# Patient Record
Sex: FEMALE | Race: AMERICAN INDIAN OR ALASKA NATIVE | NOT HISPANIC OR LATINO
[De-identification: names, ages, dates, MRNs, and addresses within clinical notes are randomized per-mention and may not be internally consistent; named-entity substitution may affect disease eponyms.]

---

## 2020-10-29 ENCOUNTER — APPOINTMENT (OUTPATIENT)
Dept: ORTHOPEDIC SURGERY | Facility: CLINIC | Age: 40
End: 2020-10-29
Payer: COMMERCIAL

## 2020-10-29 VITALS
SYSTOLIC BLOOD PRESSURE: 123 MMHG | DIASTOLIC BLOOD PRESSURE: 80 MMHG | WEIGHT: 185 LBS | HEART RATE: 84 BPM | HEIGHT: 61 IN | BODY MASS INDEX: 34.93 KG/M2

## 2020-10-29 PROCEDURE — 99072 ADDL SUPL MATRL&STAF TM PHE: CPT

## 2020-10-29 PROCEDURE — 99204 OFFICE O/P NEW MOD 45 MIN: CPT

## 2020-10-29 PROCEDURE — 73610 X-RAY EXAM OF ANKLE: CPT | Mod: LT

## 2020-10-29 RX ORDER — MELOXICAM 7.5 MG/1
7.5 TABLET ORAL
Qty: 30 | Refills: 1 | Status: ACTIVE | COMMUNITY
Start: 2020-10-29 | End: 1900-01-01

## 2020-10-29 NOTE — HISTORY OF PRESENT ILLNESS
[FreeTextEntry1] : 40 year old female presenting with left ankle pain. The patient’s pain is noted to be a 5/10. The patient's pain began on 10/15/2020. The patient describes their pain as constant, and has increased pain when standing and walking. She is currently taking NSAIDs. No other complaints at this time.

## 2020-10-29 NOTE — DISCUSSION/SUMMARY
[de-identified] : Today I had a lengthy discussion with the patient regarding their left ankle pain. I have addressed all the patient's concerns surrounding the pathology of their condition. I recommend that the patient utilize ice, NSAIDS PRN, and heat. She can also elevate the left ankle above the level of the heart. I recommend the patient undergo a course of physical therapy for the bilateral feet and ankles 2-3 times a week for a total of 6-8 weeks. A prescription was given for the physical therapy today. I recommend that the patient utilize meloxicam with food once per day as instructed. A prescription for the meloxicam was ordered for the patient in the office today. A discussion was had about utilizing custom orthotics. The patient was educated about custom orthotics in the office today and a prescription was provided today. I would like to see the patient back in the office PRN to reassess their condition. The patient understood and verbally agreed to the treatment plan. All of their questions were answered and they were satisfied with the visit. The patient should call the office if they have any questions or experience worsening symptoms.

## 2020-10-29 NOTE — ADDENDUM
[FreeTextEntry1] : I, José Luis Scott, acted solely as a scribe for Dr. Edmundo Ayala on this date 10/29/2020 .\par All medical record entries made by the Scribe were at my, Dr. Edmundo Ayala, direction and personally dictated by me on 10/29/2020 . I have reviewed the chart and agree that the record accurately reflects my personal performance of the history, physical exam, assessment and plan. I have also personally directed, reviewed, and agreed with the chart.

## 2020-10-29 NOTE — CONSULT LETTER
[Consult Letter:] : I had the pleasure of evaluating your patient, [unfilled]. [Please see my note below.] : Please see my note below. [Consult Closing:] : Thank you very much for allowing me to participate in the care of this patient.  If you have any questions, please do not hesitate to contact me. [Sincerely,] : Sincerely, [FreeTextEntry3] : Edmundo Ayala, DO\par Foot and Ankle Surgery\par

## 2020-10-29 NOTE — PHYSICAL EXAM
[de-identified] : General: Alert and oriented x3. In no acute distress. Pleasant in nature with a normal affect. No apparent respiratory distress.\par \par L Ankle Exam\par Skin: Clean, dry, intact\par Inspection: No obvious malalignment, no swelling, no effusion; no lymphadenopathy\par Pulses: 2+ DP/PT pulses\par ROM: L Ankle 10 degrees of dorsiflexion, 40 degrees of plantarflexion, 10 degrees of subtalar motion\par Tenderness: +posterior peroneals, +posterior tibial tendon. No tenderness over the lateral malleolus, no CFL/ATFL/PTFL pain. No medial malleolus pain, no deltoid ligament pain. No proximal fibular pain. No heel pain.\par Stability: Negative anterior/posterior drawer.\par Strength: 5/5 TA/GS/EHL\par Neuro: In tact to light touch throughout\par Additional tests: Negative Mortons test, Negative syndesmosis squeeze test. \par \par BL Standing Exam\par +pes planus [de-identified] : 3V of the left ankle were ordered obtained and reviewed by me today, 10/29/2020 , revealed: No fracture. Posterior tibial tendon dysfunction.

## 2021-09-03 ENCOUNTER — EMERGENCY (EMERGENCY)
Facility: HOSPITAL | Age: 41
LOS: 0 days | Discharge: HOME | End: 2021-09-03
Attending: EMERGENCY MEDICINE
Payer: COMMERCIAL

## 2021-09-03 VITALS
RESPIRATION RATE: 15 BRPM | DIASTOLIC BLOOD PRESSURE: 83 MMHG | SYSTOLIC BLOOD PRESSURE: 147 MMHG | TEMPERATURE: 98 F | HEART RATE: 62 BPM | OXYGEN SATURATION: 98 %

## 2021-09-03 VITALS — WEIGHT: 164.91 LBS | HEIGHT: 65 IN

## 2021-09-03 DIAGNOSIS — N89.8 OTHER SPECIFIED NONINFLAMMATORY DISORDERS OF VAGINA: ICD-10-CM

## 2021-09-03 PROCEDURE — 99285 EMERGENCY DEPT VISIT HI MDM: CPT

## 2021-09-03 PROCEDURE — 76856 US EXAM PELVIC COMPLETE: CPT

## 2021-09-03 PROCEDURE — 99284 EMERGENCY DEPT VISIT MOD MDM: CPT | Mod: 25

## 2021-09-03 PROCEDURE — 76830 TRANSVAGINAL US NON-OB: CPT | Mod: 26

## 2021-09-03 PROCEDURE — 76856 US EXAM PELVIC COMPLETE: CPT | Mod: 26

## 2021-09-03 PROCEDURE — 76830 TRANSVAGINAL US NON-OB: CPT

## 2021-09-03 NOTE — ED STATDOCS - OBJECTIVE STATEMENT
40 y/o F w/ no PMHx presents to ED c/o partial tampon left in vagina while taking out tampon, called OB/GYN and was told to come to ER. Denies pain and bleeding.

## 2021-09-03 NOTE — ED STATDOCS - PROGRESS NOTE DETAILS
42 y/o Female presents to ED c/o piece of tampon breaking off while she was removing it today.  I performed a pelvic exam and was unable to visualize or palpate any FB in vaginal vault / canal.  Small amount of blood in vaginal canal.  Cervix pink, Neg CMT.  Ordered pelvic US to confirm no FB present.  Radha Woody PA-C

## 2021-09-03 NOTE — ED STATDOCS - PATIENT PORTAL LINK FT
You can access the FollowMyHealth Patient Portal offered by [facility] by registering at the following website: [email or web address]. By joining [facility]’s FollowMyHealth portal, you will also be able to view your health information using other applications (apps) compatible with our system.

## 2022-10-18 ENCOUNTER — APPOINTMENT (OUTPATIENT)
Dept: HUMAN REPRODUCTION | Facility: CLINIC | Age: 42
End: 2022-10-18

## 2022-11-14 ENCOUNTER — APPOINTMENT (OUTPATIENT)
Dept: OBGYN | Facility: CLINIC | Age: 42
End: 2022-11-14
Payer: COMMERCIAL

## 2022-11-14 VITALS — DIASTOLIC BLOOD PRESSURE: 80 MMHG | SYSTOLIC BLOOD PRESSURE: 147 MMHG

## 2022-11-14 VITALS — HEIGHT: 61 IN | WEIGHT: 196.13 LBS | BODY MASS INDEX: 37.03 KG/M2

## 2022-11-14 LAB
HCG UR QL: POSITIVE
QUALITY CONTROL: YES

## 2022-11-14 PROCEDURE — 81025 URINE PREGNANCY TEST: CPT

## 2022-11-17 ENCOUNTER — ASOB RESULT (OUTPATIENT)
Age: 42
End: 2022-11-17

## 2022-11-17 ENCOUNTER — APPOINTMENT (OUTPATIENT)
Dept: OBGYN | Facility: CLINIC | Age: 42
End: 2022-11-17

## 2022-11-17 ENCOUNTER — APPOINTMENT (OUTPATIENT)
Dept: ANTEPARTUM | Facility: CLINIC | Age: 42
End: 2022-11-17

## 2022-11-17 DIAGNOSIS — O02.1 MISSED ABORTION: ICD-10-CM

## 2022-11-17 PROCEDURE — 99213 OFFICE O/P EST LOW 20 MIN: CPT

## 2022-11-17 PROCEDURE — 76817 TRANSVAGINAL US OBSTETRIC: CPT

## 2022-11-17 NOTE — DISCUSSION/SUMMARY
[FreeTextEntry1] : Discussed the results of her transvaginal ultrasound.  Ultrasound findings are consistent with a spontaneous miscarriage.  There is evidence of an intrauterine pregnancy.  There is an absent gestational sac as well as absent yolk sac and fetal pole and cardiac activity.  At this time I will trend her hCG levels.  hCG drawn today.  Patient to have labs drawn on Saturday.  She is to follow-up with Dr. Bragg on Monday or Tuesday of next week.  All of her questions were answered she is agreeable with plan

## 2022-11-17 NOTE — CHIEF COMPLAINT
[Urgent Visit] : Urgent Visit [FreeTextEntry1] : Patient is a 42-year-old female here today for possible missed .  She is approximately 6 weeks 6 days pregnant.  Her last menstrual period was .  She began spotting yesterday and this morning a little heavier.  She had a transvaginal sonogram today.

## 2022-11-18 LAB — HCG SERPL-MCNC: 1021 MIU/ML

## 2022-11-21 ENCOUNTER — APPOINTMENT (OUTPATIENT)
Dept: OBGYN | Facility: CLINIC | Age: 42
End: 2022-11-21

## 2022-11-21 ENCOUNTER — APPOINTMENT (OUTPATIENT)
Dept: ANTEPARTUM | Facility: CLINIC | Age: 42
End: 2022-11-21

## 2022-11-21 ENCOUNTER — RESULT CHARGE (OUTPATIENT)
Age: 42
End: 2022-11-21

## 2022-11-21 LAB
HCG SERPL-MCNC: 845 MIU/ML
HEMOGLOBIN: 12.7

## 2022-11-21 PROCEDURE — 99213 OFFICE O/P EST LOW 20 MIN: CPT

## 2022-11-21 PROCEDURE — 85018 HEMOGLOBIN: CPT | Mod: QW

## 2022-11-22 ENCOUNTER — APPOINTMENT (OUTPATIENT)
Dept: ANTEPARTUM | Facility: CLINIC | Age: 42
End: 2022-11-22

## 2022-11-22 ENCOUNTER — EMERGENCY (EMERGENCY)
Facility: HOSPITAL | Age: 42
LOS: 0 days | Discharge: HOME | End: 2022-11-22
Attending: EMERGENCY MEDICINE
Payer: COMMERCIAL

## 2022-11-22 ENCOUNTER — APPOINTMENT (OUTPATIENT)
Dept: OBGYN | Facility: CLINIC | Age: 42
End: 2022-11-22

## 2022-11-22 ENCOUNTER — ASOB RESULT (OUTPATIENT)
Age: 42
End: 2022-11-22

## 2022-11-22 ENCOUNTER — TRANSCRIPTION ENCOUNTER (OUTPATIENT)
Age: 42
End: 2022-11-22

## 2022-11-22 VITALS
SYSTOLIC BLOOD PRESSURE: 157 MMHG | DIASTOLIC BLOOD PRESSURE: 90 MMHG | TEMPERATURE: 99 F | OXYGEN SATURATION: 100 % | RESPIRATION RATE: 18 BRPM | HEART RATE: 62 BPM

## 2022-11-22 VITALS — HEIGHT: 61 IN | WEIGHT: 195.99 LBS

## 2022-11-22 DIAGNOSIS — Z3A.01 LESS THAN 8 WEEKS GESTATION OF PREGNANCY: ICD-10-CM

## 2022-11-22 DIAGNOSIS — O09.521 SUPERVISION OF ELDERLY MULTIGRAVIDA, FIRST TRIMESTER: ICD-10-CM

## 2022-11-22 DIAGNOSIS — O00.90 UNSPECIFIED ECTOPIC PREGNANCY WITHOUT INTRAUTERINE PREGNANCY: ICD-10-CM

## 2022-11-22 DIAGNOSIS — O20.0 THREATENED ABORTION: ICD-10-CM

## 2022-11-22 DIAGNOSIS — O34.11 MATERNAL CARE FOR BENIGN TUMOR OF CORPUS UTERI, FIRST TRIMESTER: ICD-10-CM

## 2022-11-22 DIAGNOSIS — D25.9 LEIOMYOMA OF UTERUS, UNSPECIFIED: ICD-10-CM

## 2022-11-22 LAB
ALBUMIN SERPL ELPH-MCNC: 3.6 G/DL — SIGNIFICANT CHANGE UP (ref 3.3–5)
ALP SERPL-CCNC: 70 U/L — SIGNIFICANT CHANGE UP (ref 40–120)
ALT FLD-CCNC: 13 U/L — SIGNIFICANT CHANGE UP (ref 12–78)
ANION GAP SERPL CALC-SCNC: 4 MMOL/L — LOW (ref 5–17)
APPEARANCE UR: CLEAR — SIGNIFICANT CHANGE UP
APTT BLD: 30.4 SEC — SIGNIFICANT CHANGE UP (ref 27.5–35.5)
AST SERPL-CCNC: 11 U/L — LOW (ref 15–37)
BASOPHILS # BLD AUTO: 0.11 K/UL — SIGNIFICANT CHANGE UP (ref 0–0.2)
BASOPHILS NFR BLD AUTO: 1.1 % — SIGNIFICANT CHANGE UP (ref 0–2)
BILIRUB SERPL-MCNC: 0.2 MG/DL — SIGNIFICANT CHANGE UP (ref 0.2–1.2)
BILIRUB UR-MCNC: NEGATIVE — SIGNIFICANT CHANGE UP
BUN SERPL-MCNC: 19 MG/DL — SIGNIFICANT CHANGE UP (ref 7–23)
CALCIUM SERPL-MCNC: 8.6 MG/DL — SIGNIFICANT CHANGE UP (ref 8.5–10.1)
CHLORIDE SERPL-SCNC: 109 MMOL/L — HIGH (ref 96–108)
CO2 SERPL-SCNC: 27 MMOL/L — SIGNIFICANT CHANGE UP (ref 22–31)
COLOR SPEC: YELLOW — SIGNIFICANT CHANGE UP
CREAT SERPL-MCNC: 0.7 MG/DL — SIGNIFICANT CHANGE UP (ref 0.5–1.3)
DIFF PNL FLD: ABNORMAL
EGFR: 111 ML/MIN/1.73M2 — SIGNIFICANT CHANGE UP
EOSINOPHIL # BLD AUTO: 0.27 K/UL — SIGNIFICANT CHANGE UP (ref 0–0.5)
EOSINOPHIL NFR BLD AUTO: 2.7 % — SIGNIFICANT CHANGE UP (ref 0–6)
FLUAV AG NPH QL: SIGNIFICANT CHANGE UP
FLUBV AG NPH QL: SIGNIFICANT CHANGE UP
GLUCOSE SERPL-MCNC: 96 MG/DL — SIGNIFICANT CHANGE UP (ref 70–99)
GLUCOSE UR QL: NEGATIVE — SIGNIFICANT CHANGE UP
HCG SERPL-ACNC: 935 MIU/ML — HIGH
HCG SERPL-MCNC: 956 MIU/ML
HCT VFR BLD CALC: 40.9 % — SIGNIFICANT CHANGE UP (ref 34.5–45)
HGB BLD-MCNC: 13.8 G/DL — SIGNIFICANT CHANGE UP (ref 11.5–15.5)
IMM GRANULOCYTES NFR BLD AUTO: 0.2 % — SIGNIFICANT CHANGE UP (ref 0–0.9)
INR BLD: 1.02 RATIO — SIGNIFICANT CHANGE UP (ref 0.88–1.16)
KETONES UR-MCNC: ABNORMAL
LEUKOCYTE ESTERASE UR-ACNC: ABNORMAL
LYMPHOCYTES # BLD AUTO: 3.2 K/UL — SIGNIFICANT CHANGE UP (ref 1–3.3)
LYMPHOCYTES # BLD AUTO: 31.8 % — SIGNIFICANT CHANGE UP (ref 13–44)
MCHC RBC-ENTMCNC: 29.2 PG — SIGNIFICANT CHANGE UP (ref 27–34)
MCHC RBC-ENTMCNC: 33.7 GM/DL — SIGNIFICANT CHANGE UP (ref 32–36)
MCV RBC AUTO: 86.7 FL — SIGNIFICANT CHANGE UP (ref 80–100)
MONOCYTES # BLD AUTO: 0.69 K/UL — SIGNIFICANT CHANGE UP (ref 0–0.9)
MONOCYTES NFR BLD AUTO: 6.9 % — SIGNIFICANT CHANGE UP (ref 2–14)
NEUTROPHILS # BLD AUTO: 5.76 K/UL — SIGNIFICANT CHANGE UP (ref 1.8–7.4)
NEUTROPHILS NFR BLD AUTO: 57.3 % — SIGNIFICANT CHANGE UP (ref 43–77)
NITRITE UR-MCNC: NEGATIVE — SIGNIFICANT CHANGE UP
PH UR: 5 — SIGNIFICANT CHANGE UP (ref 5–8)
PLATELET # BLD AUTO: 342 K/UL — SIGNIFICANT CHANGE UP (ref 150–400)
POTASSIUM SERPL-MCNC: 4.2 MMOL/L — SIGNIFICANT CHANGE UP (ref 3.5–5.3)
POTASSIUM SERPL-SCNC: 4.2 MMOL/L — SIGNIFICANT CHANGE UP (ref 3.5–5.3)
PROT SERPL-MCNC: 7.8 GM/DL — SIGNIFICANT CHANGE UP (ref 6–8.3)
PROT UR-MCNC: 30 MG/DL
PROTHROM AB SERPL-ACNC: 11.8 SEC — SIGNIFICANT CHANGE UP (ref 10.5–13.4)
RBC # BLD: 4.72 M/UL — SIGNIFICANT CHANGE UP (ref 3.8–5.2)
RBC # FLD: 12.3 % — SIGNIFICANT CHANGE UP (ref 10.3–14.5)
RSV RNA NPH QL NAA+NON-PROBE: SIGNIFICANT CHANGE UP
SARS-COV-2 RNA SPEC QL NAA+PROBE: SIGNIFICANT CHANGE UP
SODIUM SERPL-SCNC: 140 MMOL/L — SIGNIFICANT CHANGE UP (ref 135–145)
SP GR SPEC: 1.02 — SIGNIFICANT CHANGE UP (ref 1.01–1.02)
UROBILINOGEN FLD QL: 1
WBC # BLD: 10.05 K/UL — SIGNIFICANT CHANGE UP (ref 3.8–10.5)
WBC # FLD AUTO: 10.05 K/UL — SIGNIFICANT CHANGE UP (ref 3.8–10.5)

## 2022-11-22 PROCEDURE — 80053 COMPREHEN METABOLIC PANEL: CPT

## 2022-11-22 PROCEDURE — 99284 EMERGENCY DEPT VISIT MOD MDM: CPT | Mod: 25

## 2022-11-22 PROCEDURE — 85018 HEMOGLOBIN: CPT | Mod: QW

## 2022-11-22 PROCEDURE — 86901 BLOOD TYPING SEROLOGIC RH(D): CPT

## 2022-11-22 PROCEDURE — 76817 TRANSVAGINAL US OBSTETRIC: CPT | Mod: 26

## 2022-11-22 PROCEDURE — 0241U: CPT

## 2022-11-22 PROCEDURE — 76817 TRANSVAGINAL US OBSTETRIC: CPT

## 2022-11-22 PROCEDURE — 85610 PROTHROMBIN TIME: CPT

## 2022-11-22 PROCEDURE — 84702 CHORIONIC GONADOTROPIN TEST: CPT

## 2022-11-22 PROCEDURE — 85025 COMPLETE CBC W/AUTO DIFF WBC: CPT

## 2022-11-22 PROCEDURE — 86850 RBC ANTIBODY SCREEN: CPT

## 2022-11-22 PROCEDURE — 99214 OFFICE O/P EST MOD 30 MIN: CPT

## 2022-11-22 PROCEDURE — 96372 THER/PROPH/DIAG INJ SC/IM: CPT

## 2022-11-22 PROCEDURE — 99285 EMERGENCY DEPT VISIT HI MDM: CPT

## 2022-11-22 PROCEDURE — 86900 BLOOD TYPING SEROLOGIC ABO: CPT

## 2022-11-22 PROCEDURE — 87086 URINE CULTURE/COLONY COUNT: CPT

## 2022-11-22 PROCEDURE — 81001 URINALYSIS AUTO W/SCOPE: CPT

## 2022-11-22 PROCEDURE — 85730 THROMBOPLASTIN TIME PARTIAL: CPT

## 2022-11-22 PROCEDURE — 36415 COLL VENOUS BLD VENIPUNCTURE: CPT

## 2022-11-22 RX ORDER — METHOTREXATE 2.5 MG/1
100 TABLET ORAL ONCE
Refills: 0 | Status: COMPLETED | OUTPATIENT
Start: 2022-11-22 | End: 2022-11-22

## 2022-11-22 RX ORDER — SODIUM CHLORIDE 9 MG/ML
1000 INJECTION INTRAMUSCULAR; INTRAVENOUS; SUBCUTANEOUS ONCE
Refills: 0 | Status: COMPLETED | OUTPATIENT
Start: 2022-11-22 | End: 2022-11-22

## 2022-11-22 RX ADMIN — SODIUM CHLORIDE 1000 MILLILITER(S): 9 INJECTION INTRAMUSCULAR; INTRAVENOUS; SUBCUTANEOUS at 18:05

## 2022-11-22 RX ADMIN — METHOTREXATE 100 MILLIGRAM(S): 2.5 TABLET ORAL at 21:52

## 2022-11-22 NOTE — PLAN
[FreeTextEntry1] : Discussed results of sonogram her fibroids need to be addressed after methotrexate is given for suspected ectopic pregnancy. I spoke with patient with MD marie on speaker phone and recommendation is to send patient to the ED.\par Patient sent to the ED for methotrexate for suspected ctopic pregnancy. \par Plan is for patient to follow up in office next Tuesday for an apt with JW and hcg level.\par all her questions were answered she was agreeable with plan.\par OBGYN residents  aware and ED was called and gave report. \par

## 2022-11-22 NOTE — ED STATDOCS - NSFOLLOWUPINSTRUCTIONS_ED_ALL_ED_FT
FOLLOW UP WITH DR OCAMPO ON FRIDAY. CALL THE OFFICE TO MAKE AN APPOINTMENT. RETURN TO ER FOR ANY WORSENING SYMPTOMS OR NEW CONCERNS.     Ectopic Pregnancy       An ectopic pregnancy happens when a fertilized egg attaches (implants) outside the uterus. In a normal pregnancy, a fertilized egg implants in the uterus. An ectopic pregnancy cannot develop into a healthy baby. Most ectopic pregnancies occur in one of the fallopian tubes, which is where an egg travels from an ovary to get to the uterus. This is called a tubal pregnancy. An ectopic pregnancy can also happen on an ovary, on the cervix, or in the abdomen.    When a fertilized egg implants on tissue outside the uterus and begins to grow, it may cause the tissue to tear or burst. This is known as a ruptured ectopic pregnancy. The tear or burst causes internal bleeding. This may cause intense pain in the abdomen. An ectopic pregnancy is a medical emergency and can be life-threatening.      What are the causes?    The most common cause of this condition is damage to one of the fallopian tubes. A fallopian tube may be narrowed or blocked, and that stops the fertilized egg from reaching the uterus.    Sometimes, the cause of this condition is not known.      What increases the risk?    The following factors may make you more likely to develop this condition:  •Having gone through infertility treatment before.      •Having had an ectopic pregnancy before.      •Having had surgery to have the fallopian tubes tied.      •Becoming pregnant while using an intrauterine device for birth control.      •Taking birth control pills before the age of 16.      Other risk factors include:  •Smoking.      •Alcohol use.      •History of JOSE exposure. JOSE is a medicine that was used until 1971 and affected babies whose mothers took the medicine.        What are the signs or symptoms?    Common symptoms of this condition include:  •Missing a menstrual period.      •Nausea or tiredness.      •Tender breasts.      •Other normal pregnancy symptoms.      Other symptoms may include:  •Pain during sex.      •Vaginal bleeding or spotting.      •Cramping or pain in the lower abdomen.      •A fast heartbeat, low blood pressure, and sweating.      •Pain or increased pressure while having a bowel movement.      Symptoms of a ruptured ectopic pregnancy and internal bleeding may include:  •Sudden, severe pain in the abdomen.      •Dizziness, weakness, feeling light-headed, or fainting.      •Pain in the shoulder or neck area.        How is this diagnosed?    This condition is diagnosed by:  •A blood test to check for the pregnancy hormone.      •A pelvic exam to find painful areas or a mass in the abdomen.      •Ultrasound. A probe is inserted into the vagina to see if there is a pregnancy in or outside the uterus.      •Taking a sample of tissue from the uterus.      •Surgery to look closely at the fallopian tubes through an incision in the abdomen.        How is this treated?    This condition is usually treated with medicine or surgery. Sometimes, ectopic pregnancies can resolve on their own, under close monitoring by your health care provider.    Medicine     A medicine called methotrexate may be given to cause the pregnancy tissue to be absorbed. The medicine may be given if:  •The diagnosis is made early, with no signs of active bleeding.      •The fallopian tube has not torn or burst.      You will need blood tests to make sure the medicine is working. It may take 4–6 weeks for the pregnancy tissues to be absorbed.    Surgery    Surgery may be performed to:  •Remove the pregnancy tissue.      •Stop internal bleeding.      •Remove part or all of the fallopian tube.      •Remove the uterus. This is rare.      After surgery, you may need to have blood tests to make sure the surgery worked.      Follow these instructions at home:    Medicines     •Take over-the-counter and prescription medicines only as told by your health care provider.    •Ask your health care provider if the medicine prescribed to you:  •Requires you to avoid driving or using machinery.    •Can cause constipation. You may need to take these actions to prevent or treat constipation:  •Drink enough fluid to keep your urine pale yellow.      •Take over-the-counter or prescription medicines.      •Eat foods that are high in fiber, such as beans, whole grains, and fresh fruits and vegetables.      •Limit foods that are high in fat and processed sugars, such as fried or sweet foods.          General instructions     •Rest or limit your activity, if told by your health care provider.      • Do not have sex or put anything in your vagina, such as tampons or douches, for 6 weeks or until your health care provider says it is safe.      • Do not lift anything that is heavier than 10 lb (4.5 kg), or the limit that you are told, until your health care provider says that it is safe.      •Return to your normal activities as told by your health care provider. Ask your health care provider what activities are safe for you.      •Keep all follow-up visits. This is important.        Contact a health care provider if:    •You have a fever or chills.      •You have nausea and vomiting.        Get help right away if:    •Your pain gets worse or is not relieved by medicine.      •You feel dizzy or weak.      •You feel light-headed or you faint.      •You have sudden, severe pain in your abdomen.      •You have sudden pain in the shoulder or neck area.        Summary    •An ectopic pregnancy happens when a fertilized egg implants outside the uterus. Most ectopic pregnancies occur in one of the fallopian tubes.      •An ectopic pregnancy is a medical emergency and can be life-threatening.      •The most common cause of this condition is damage to one of the fallopian tubes.      •This condition is usually treated with medicine or surgery. Some ectopic pregnancies resolve on their own, under close monitoring by your health care provider.      This information is not intended to replace advice given to you by your health care provider. Make sure you discuss any questions you have with your health care provider.      Document Revised: 03/30/2021 Document Reviewed: 03/30/2021    Elsevier Patient Education © 2022 [employer]

## 2022-11-22 NOTE — ED ADULT NURSE NOTE - CHIEF COMPLAINT QUOTE
Patient is alert and oriented X3, presenting to the ER with c/o ectopic pregnancy. Patient reports "I am about 7-8 weeks pregnant. Last Thursday I began to spot so they took me into the OB and did a sonogram. The bleeding over the weekend was worse, I went through 1 pad every couple hours. Then today they did a sonogram where they found the pregnancy  outside the uterus. Dr. Bragg advised that I come to the ER for further evaluation." Denies CP, SOB, fevers, nausea, vomiting. .   HX: denies. No medication taken prior to arrival.

## 2022-11-22 NOTE — HISTORY OF PRESENT ILLNESS
[FreeTextEntry1] : Due to patients hcg levels going from 1021 to 845 to 956, patient was advised to have a pelvic sonogram today to r/o ectopic pregnancy\par \par Her sonogram today revealed no intrauterine pregnancy and ectopic cannot be ruled out.\par Her sonogram also revealed 2 fibroids measuring 5cm and 3cm\par \par patient is still bleeding at this time, it is a little lighter than over the weekend.\par \par

## 2022-11-22 NOTE — ED STATDOCS - OBJECTIVE STATEMENT
42 year old female currently 7 weeks pregnant presents to the ED complaining of a threatened .  pt began vaginally bleeding with 3 days of sharp stabbing abdominal pain. Pt went for a transvaginal ultrasound on , was told it was likely a miscarriage. Pt had outpatient lab work on  to test HCG levels. Pt endorses that bleeding was worse over the weekend. Pt was called today and was told HCG levels have not gone down. Pt reports that bleeding has started to get lighter today. This is pt's second pregnancy, 1st was an  when she was 21 years old. Denies feeling faint or lightheaded.

## 2022-11-22 NOTE — ED STATDOCS - NS ED ROS FT
Constitutional: nad, well appearing  HEENT:  no nasal congestion, eye drainage or ear pain.    CVS:  no cp  Resp:  No sob, no cough  GI:  no abdominal pain, no nausea or vomiting  :  no dysuria. + vaginal bleeding  MSK: no joint pain or limited ROM  Skin: no rash  Neuro: no change in mental status or level of consciousness  Heme/lymph: no bleeding

## 2022-11-22 NOTE — ED ADULT NURSE NOTE - OBJECTIVE STATEMENT
Pt, presenting to the ER with c/o ectopic pregnancy. Patient reports "I am about 7-8 weeks pregnant". Last thurs pt had some vag bleeding mild spotting and went to OB for a sonogram. The bleeding over the weekend was worse, has mild bleeding  today. I went through 1 pad every couple hours. Then today they did a sonogram where they found the pregnancy  outside the uterus. Dr. Bragg advised that I come to the ER for further evaluation." Denies CP, SOB, fevers, nausea, vomiting. . Pt tearful accompanied by partner.

## 2022-11-22 NOTE — ED ADULT TRIAGE NOTE - CHIEF COMPLAINT QUOTE
Patient is alert and oriented X3, presenting to the ER with c/o ectopic pregnancy. Patient reports "I am about 7-8 weeks pregnant. Last Thursday I began to spot so they took me into the OB and did a sonogram. The bleeding over the weekend was worse, I went through 1 pad every couple hours. Then today they did a sonogram where they found the pregnancy  outside the uterus. Dr. Bragg advised that I come to the ER for further evaluation." Denies CP, SOB, fevers, nausea, vomiting.   HX: denies. No medication taken prior to arrival. Patient is alert and oriented X3, presenting to the ER with c/o ectopic pregnancy. Patient reports "I am about 7-8 weeks pregnant. Last Thursday I began to spot so they took me into the OB and did a sonogram. The bleeding over the weekend was worse, I went through 1 pad every couple hours. Then today they did a sonogram where they found the pregnancy  outside the uterus. Dr. Bragg advised that I come to the ER for further evaluation." Denies CP, SOB, fevers, nausea, vomiting. .   HX: denies. No medication taken prior to arrival.

## 2022-11-22 NOTE — ED STATDOCS - PATIENT PORTAL LINK FT
You can access the FollowMyHealth Patient Portal offered by [facility] by registering at the following website: [email or web address]. By joining [facility]’s FollowMyHealth portal, you will also be able to view your health information using other applications (apps) compatible with our system.

## 2022-11-22 NOTE — ED STATDOCS - CLINICAL SUMMARY MEDICAL DECISION MAKING FREE TEXT BOX
Will discuss with OB. Pt had document IUP on outpatient ultrasound today, likelihood of ectopic or heterotopic pregnancy low. Determine need for methotrexate and reassess. Will discuss with OB. Pt had documented IUP on outpatient ultrasound today, likelihood of ectopic or heterotopic pregnancy low. Determine need for methotrexate and reassess.

## 2022-11-22 NOTE — CONSULT NOTE ADULT - SUBJECTIVE AND OBJECTIVE BOX
HPI: 42y  approx 7wks presents from [facility] office after being sent by Dr. Bragg for inappropriately rising bhcg on outpatient labs. Pt initially with bhcg  1021,  845,  956, with inappropriate rise, and outpatient sono today at [facility]  with pregnancy of unknown location, no IUP visualized. Given bhcg trend Pt OBGYN was concerned for ectopic pregnancy and sent her in for methotrexate. Pt reports irregular bleeding since saturday which initially improved and then got heavy again. Denies pain, nausea, vomiting. TVUS and bhcg repeat here in ED and bcg from today is 935, plateau.    PMH: denies  PSH: ear surgery, tonsillectomy  OB: etOP @ age 21  GYN: denies  SOCIAL: denies  Allergies    No Known Allergies    Intolerances      MEDS:      Vital Signs Last 24 Hrs  T(C): 36.9 (2022 16:41), Max: 36.9 (2022 16:41)  T(F): 98.4 (2022 16:41), Max: 98.4 (2022 16:41)  HR: 67 (2022 16:41) (67 - 67)  BP: 144/83 (2022 16:41) (144/83 - 144/83)  BP(mean): 102 (2022 16:41) (102 - 102)  RR: 18 (2022 16:41) (18 - 18)  SpO2: 100% (2022 16:41) (100% - 100%)    Parameters below as of 2022 16:41  Patient On (Oxygen Delivery Method): room air      PHYSICAL EXAM:  GENERAL: NAD  ABDOMEN: Soft, Nontender, Nondistended; Bowel sounds present  EXTREMITIES:  2+ Peripheral Pulses, No clubbing, cyanosis, or edema  PELVIC: deferred    LABS:                        13.8   10.05 )-----------( 342      ( 2022 18:03 )             40.9         140  |  109<H>  |  19  ----------------------------<  96  4.2   |  27  |  0.70    Ca    8.6      2022 18:03    TPro  7.8  /  Alb  3.6  /  TBili  0.2  /  DBili  x   /  AST  11<L>  /  ALT  13  /  AlkPhos  70  11-    Urinalysis Basic - ( 2022 18:03 )    Color: Yellow / Appearance: Clear / S.025 / pH: x  Gluc: x / Ketone: Trace  / Bili: Negative / Urobili: 1   Blood: x / Protein: 30 mg/dL / Nitrite: Negative   Leuk Esterase: Trace / RBC: 11-25 /HPF / WBC 0-2   Sq Epi: x / Non Sq Epi: Moderate / Bacteria: Occasional          RADIOLOGY STUDIES:  TVUS pending

## 2022-11-22 NOTE — ED STATDOCS - PROGRESS NOTE DETAILS
signed Astrid Mueller PA-C Pt seen initially in intake by Dr Parsons.   42F sent from OB Dr Bragg's office for methotrexate for ectopic pregnancy. I spoke to OB GAY Proctor, resident is in OR, she will relay that pt is here. signed Astrid Mueller PA-C   No IUP on sono, hcg 900. OB will see pt and consent for MTX in ED. Pt to f/u with Dr Bragg on Friday. outpatient read that patient had with her was reportedly prelim and there was in fact no IUP identified.  This was discussed with OB and verified in HIE.  Plan as per ob.  AVSS.

## 2022-11-22 NOTE — CONSULT NOTE ADULT - ASSESSMENT
A/P:42y  approx 7wks with putpatient TVUS with no IUP and inappropriately rising/plateauing bhcg concerning for ectopic pregnancy  CBC, CMP wnl  B pos, no rhogam indicated  hcg 1021> 845> 956> 935, inappropriately rising bhcg/plateauing with no IUP on outpatient sono concerning for ectopic pregnancy  will await inpatient sono, however likely will proceed with methotrexate    The patient was counseled extensively on expectant vs medical vs surgical management for ectopic pregnancy. Risks of expectant management were reviewed including tubal rupture, hemorrhage, emergency surgery, and death. Benefits of medical management were reviewed including avoiding  the inherent risks of surgery and anesthesia. Risks of medical management with methotrexate were reviewed including possible rupture of ectopic, possible need for re-dose, and possible need for subsequent surgery discussed. Contraindications were reviewed including absolute (Intrauterine pregnancy, Evidence of immunodeficiency, Moderate to severe anemia, leukopenia, or thrombocytopenia, Sensitivity to methotrexate, Active pulmonary disease, Active peptic ulcer disease, Clinically important hepatic dysfunction, Clinically important renal dysfunction, Breastfeeding, Ruptured ectopic pregnancy, Hemodynamically unstable patient, Inability to participate in follow-up) and relative (Embryonic cardiac activity detected by transvaginal ultrasonography, High initial hCG concentration, Ectopic pregnancy greater than 4 cm in size as imaged by TVUS, Refusal to accept blood transfusion) contraindications. Risks of methotrexate were reviewed including but not limited to GI problems (nausea, vomiting, stomatitis), vaginal spotting, abdominal pain/cramping, elevated liver enzymes, rare incidence of alopecia and pneumonitis. Medical management, compared with surgical management, has a 15-20% lower success rate and requires longer surveillance, more office visits, and phlebotomy. Effects of future fertility were reviewed with the patient. Patient was counseled that evidence suggests that methotrexate treatment of ectopic pregnancy does not have an adverse effect on subsequent fertility or on ovarian reserve.    Patient elected for management with methotrexate. Consents for methotrexate signed with patient with attending at bedside. Chemotherapy drug order form sent  to pharmacy with methotrexate dose calculated based on BSA for patient. Patient was given strict precautions (severe abdominal pain, dizziness, lightheadedness, severe n/v, or any heavy vaginal bleeding >2pads/hour for >2hours) on when to return and educated about the symptoms of tubal rupture and to emphasize the need to seek immediate medical attention if these symptoms occur. Vigorous activity and sexual intercourse recommended to be avoided until confirmation of resolution because of the theoretical risk of inducing rupture of the ectopic pregnancy. Advised to avoid folic acid supplements, foods that contain folic acid, and nonsteroidal antiinflammatory drugs during therapy because these products may decrease the efficacy of methotrexate. Recommended avoidance of narcotic analgesic medications, alcohol, and gas-producing foods are recommended so as not to mask, or be confused with, escalation of symptoms of rupture. Sunlight exposure also should be avoided during treatment to limit the risk of methotrexate dermatitis. Pt understands she cannot attempt to conceive for at least 3 months. Patient instructed on need for follow up of b-hcg on day 4 and day 7 of methotrexate therapy. Once patient receives methotrexate therapy she is cleared for discharge from OBGYN perspective.  Primary management per ED team.     d/w Dr. Burrell

## 2022-11-24 LAB
CULTURE RESULTS: SIGNIFICANT CHANGE UP
SPECIMEN SOURCE: SIGNIFICANT CHANGE UP

## 2022-11-25 ENCOUNTER — EMERGENCY (EMERGENCY)
Facility: HOSPITAL | Age: 42
LOS: 0 days | Discharge: HOME | End: 2022-11-25
Attending: EMERGENCY MEDICINE
Payer: COMMERCIAL

## 2022-11-25 VITALS
RESPIRATION RATE: 18 BRPM | TEMPERATURE: 98 F | SYSTOLIC BLOOD PRESSURE: 116 MMHG | OXYGEN SATURATION: 100 % | HEART RATE: 62 BPM | DIASTOLIC BLOOD PRESSURE: 83 MMHG

## 2022-11-25 VITALS — HEIGHT: 61 IN | WEIGHT: 195.11 LBS

## 2022-11-25 DIAGNOSIS — Z01.89 ENCOUNTER FOR OTHER SPECIFIED SPECIAL EXAMINATIONS: ICD-10-CM

## 2022-11-25 LAB — HCG SERPL-ACNC: 670 MIU/ML — HIGH

## 2022-11-25 PROCEDURE — 99284 EMERGENCY DEPT VISIT MOD MDM: CPT

## 2022-11-25 PROCEDURE — 99283 EMERGENCY DEPT VISIT LOW MDM: CPT

## 2022-11-25 PROCEDURE — 36415 COLL VENOUS BLD VENIPUNCTURE: CPT

## 2022-11-25 PROCEDURE — 84702 CHORIONIC GONADOTROPIN TEST: CPT

## 2022-11-25 NOTE — ED STATDOCS - NSFOLLOWUPINSTRUCTIONS_ED_ALL_ED_FT
Methotrexate Treatment for an Ectopic Pregnancy      Methotrexate is a medicine that treats an ectopic pregnancy. In this type of pregnancy, the fertilized egg attaches (implants) outside the uterus. An ectopic pregnancy cannot develop into a healthy baby. Methotrexate works by stopping the growth of the fertilized egg. It also helps the body absorb tissue from the egg. This takes about 2–6 weeks.    An ectopic pregnancy can be life-threatening. However, most ectopic pregnancies can be successfully treated with methotrexate if they are diagnosed early.      Tell a health care provider about:    •Any allergies you have.      •All medicines you are taking, including vitamins, herbs, eye drops, creams, and over-the-counter medicines.      •Any medical conditions you have.        What are the risks?    Generally, this is a safe treatment. However, problems may occur, including:•Digestive problems. You may have:  •Nausea.      •Vomiting.      •Diarrhea.      •Cramping in your abdomen.        •Bleeding or spotting from your vagina.      •Feeling dizzy or light-headed.      •Mouth sores.      •Inflammation of the lining of your lungs (pneumonitis).      •Damage to nearby structures or organs, such as damage to the liver.      •Hair loss.      There is a risk that methotrexate treatment will fail and the pregnancy will continue. There is also a risk that the ectopic pregnancy might tear or burst (rupture) during use of this medicine.      What happens before the procedure?    •Blood tests will be done to check how your disease-fighting system (immune system), liver, and kidneys are working.      •You will also have blood tests to measure your pregnancy hormone levels and to find out your blood type.    •You will be given a shot of a medicine called Rho(D) immune globulin if:  •You are Rh-negative and the father is Rh-positive.      •You are Rh-negative and the father's Rh type is unknown.          What happens during the procedure?  •Methotrexate will be injected into your muscle.  •Methotrexate may be given as a single dose of medicine or a series of doses over time, depending on your response to the treatment.      •Methotrexate injections are given by a health care provider. Injection is the most common way that this medicine is used to treat an ectopic pregnancy.        •You may also receive other medicines to manage your ectopic pregnancy.      The procedure may vary among health care providers and hospitals.      What can I expect after treatment?    After your treatment, it is common to have:  •Cramping in your abdomen.      •Bleeding in your vagina.      •Tiredness (fatigue).      •Nausea.      •Vomiting.      •Diarrhea.      Blood tests will be done at timed intervals for several days or weeks to check your pregnancy hormone levels. The blood tests will be done until the pregnancy hormone can no longer be found in the blood.    If the methotrexate treatment does not work, a surgical procedure may be done to remove the ectopic pregnancy.      Follow these instructions at home:     Medicines     •Take over-the-counter and prescription medicines only as told by your health care provider.      • Do not take prescription pain medicines, aspirin, ibuprofen, naproxen, or any other NSAIDs.      • Do not take folic acid, prenatal vitamins, or other vitamins that contain folic acid.      Activity     • Do not have sex, douche, or put anything, such as tampons, in your vagina until your health care provider says it is okay.      •Limit activities that take a lot of effort as told by your health care provider.      General instructions     • Do not drink alcohol.      •Follow instructions from your health care provider about eating restrictions, such as avoiding foods that produce a lot of gas. These foods can hide the signs of a ruptured ectopic pregnancy.      •Limit exposure to sunlight or artificial UV light such as from tanning beds. Methotrexate can make you more sensitive to the sun.      •Follow instructions from your health care provider on how and when to report any symptoms that may indicate a ruptured ectopic pregnancy.      •Keep all follow-up visits. This is important.        Contact a health care provider if:    •You have persistent nausea and vomiting.      •You have persistent diarrhea.    •You are having a reaction to the medicine. This may include:  •Unusual fatigue.      •Skin rash.          Get help right away if:    •Pain in your abdomen or in the area between your hip bones (pelvic area) gets worse.      •You have more bleeding from your vagina.      •You feel light-headed or you faint.      •You are short of breath.      •Your heart rate increases.      •You develop a cough.      •You have chills or a fever.        Summary    •Methotrexate is a medicine that treats an ectopic pregnancy. This type of pregnancy forms outside the uterus.      •There is a risk that methotrexate treatment will fail and the pregnancy will continue. There is also a risk that the ectopic pregnancy might tear or burst during use of this medicine.      •This medicine may be given in a single dose or a series of doses over time.      •After your treatment, blood tests will be done at timed intervals for several days or weeks to check your pregnancy hormone levels. The blood tests will be done until no more pregnancy hormone is found in the blood.

## 2022-11-25 NOTE — ED ADULT TRIAGE NOTE - CHIEF COMPLAINT QUOTE
Pt comes to the ED for bloodwork. Pt was seen in ED on Tuesday and received methotrexate. Pt states that she was told to come back today for the bloodwork because OB is not open. Pt with no complaints.

## 2022-11-25 NOTE — PROGRESS NOTE ADULT - SUBJECTIVE AND OBJECTIVE BOX
AN YBARRA  42y  Female [id number]    HPI:  43yo female here for Day 4 bhcg s/p MTX on 11/22.  Patient appears well, denies any pain, and has no complaints.  Bhcg on 11/22 was 935 and today is 670.  US without evidence of ectopic or IUP.    REVIEW OF SYSTEMS:  CONSTITUTIONAL: No weakness, fevers or chills  EYES/ENT: No visual changes;  No vertigo or throat pain   NECK: No pain or stiffness  RESPIRATORY: No cough, wheezing, hemoptysis; No shortness of breath  CARDIOVASCULAR: No chest pain or palpitations  GASTROINTESTINAL: No abdominal or epigastric pain. No nausea, vomiting, or hematemesis; No diarrhea or constipation. No melena or hematochezia.  GENITOURINARY: No dysuria, frequency or hematuria  NEUROLOGICAL: No numbness or weakness  SKIN: No itching, burning, rashes, or lesions   All other review of systems is negative unless indicated above    MEDICATIONS  (STANDING):    MEDICATIONS  (PRN):      Allergies    No Known Allergies    Intolerances        PAST MEDICAL & SURGICAL HISTORY:  No pertinent past medical history      No significant past surgical history          OB/GYN HISTORY:   Menarchy           Cycle Length              Days Flow                                       Last Menstrual Period                                         G    P                                       Last Pap smear:   [id number]                                            FAMILY HISTORY:      SOCIAL HISTORY:    Vital Signs Last 24 Hrs  T(C): 36.8 (25 Nov 2022 12:20), Max: 36.8 (25 Nov 2022 12:20)  T(F): 98.3 (25 Nov 2022 12:20), Max: 98.3 (25 Nov 2022 12:20)  HR: 62 (25 Nov 2022 12:20) (62 - 62)  BP: 116/83 (25 Nov 2022 12:20) (116/83 - 116/83)  BP(mean): 95 (25 Nov 2022 12:20) (95 - 95)  RR: 18 (25 Nov 2022 12:20) (18 - 18)  SpO2: 100% (25 Nov 2022 12:20) (100% - 100%)    Parameters below as of 25 Nov 2022 12:20  Patient On (Oxygen Delivery Method): room air        Last Menstrual Period      PHYSICAL EXAM:  Constitutional: NAD, awake and alert, well-developed  HEENT: PERR, EOMI, Normal Hearing, MMM  Neck: Soft and supple, No LAD, No JVD  Respiratory: Breath sounds are clear bilaterally, No wheezing, rales or rhonchi  Cardiovascular: S1 and S2, regular rate and rhythm, no Murmurs, gallops or rubs  Gastrointestinal: Bowel Sounds present, soft, nontender, nondistended, no guarding, no rebound  Extremities: No peripheral edema  Vascular: 2+ peripheral pulses  Neurological: A/O x 3, no focal deficits  Musculoskeletal: 5/5 strength b/l upper and lower extremities  Skin: No rashes    Plan:  Pt already has appt for Day 7 labs in the office with Dr. Bragg.  All hemorrhage, sab, and ectopic precautions reviewed.  All questions answered.  SUN Yee MD

## 2022-11-25 NOTE — ED STATDOCS - OBJECTIVE STATEMENT
43 y/o female w/ a PMHx of  presents to the ED for repeat blood word after receiving Methotrexate shot for an ectopic pregnancy x4 days ago. Pt states she was supposed to get the repeat labs at her OB office but the office is closed all weekend. Pt reports some bleeding, states it is improving. Denies dizziness and lightheadedness. OBGYN: Dr. Bragg. Nonsmoker. Nondrinker. NKDA.

## 2022-11-25 NOTE — ED STATDOCS - PATIENT PORTAL LINK FT
You can access the FollowMyHealth Patient Portal offered by  by registering at the following website: [email or web address]. By joining [facility]’s FollowMyHealth portal, you will also be able to view your health information using other applications (apps) compatible with our system.

## 2022-11-25 NOTE — ED STATDOCS - CLINICAL SUMMARY MEDICAL DECISION MAKING FREE TEXT BOX
41 y/o female presents for repeat HCG. Pt had Methotrexate for ectopy pregnancy, told to get repat HCG on days 4 and 7 however OB is closed, no Sx otherwise. Will touch base w/ OB, check beta, and reassess. 43 y/o female presents for repeat HCG. Pt had Methotrexate for ectopy pregnancy, told to get repeat HCG on days 4 and 7 however OB is closed, no Sx otherwise. Will touch base w/ OB, check beta, and reassess.

## 2022-11-25 NOTE — ED STATDOCS - NS_ ATTENDINGSCRIBEDETAILS _ED_A_ED_FT
I, Wade Botello MD,  performed the initial face to face bedside interview with this patient regarding history of present illness, review of symptoms and relevant past medical, social and family history.  I completed an independent physical examination.  I was the initial provider who evaluated this patient.   I personally saw the patient and performed a substantive portion of the visit including all aspects of the medical decision making.  The history, relevant review of systems, past medical and surgical history, medical decision making, and physical examination was documented by the scribe in my presence and I attest to the accuracy of the documentation.

## 2022-11-25 NOTE — ED STATDOCS - ATTENDING APP SHARED VISIT CONTRIBUTION OF CARE
I, Wade Botello MD, personally saw the patient with ACP.  I have personally performed a face to face diagnostic evaluation on this patient.  I have reviewed the ACP note and agree with the history, exam, and plan of care, except as noted.   The initial assessment was performed by myself and then the patient was handed off to the ACP. The patient was followed and re-evaluated by the ACP. All labs, imaging and procedures were evaluated and performed by the ACP and I was available for consultation if any questions in the patients care came up.

## 2022-11-25 NOTE — ED STATDOCS - PROGRESS NOTE DETAILS
spoke to OB team will consult. ORTIZ RASHID pt aware of beta level still awaiting ob team. elena RASHID OB team eval pt and recommend fu with Dr. Bragg on Monday as schedule. -Sue Pina PA-C

## 2022-11-25 NOTE — ED ADULT NURSE NOTE - OBJECTIVE STATEMENT
Pt 42yr old female, A&OX4 and ambulatory, sent in Dr. Bragg's office from HCG level s/p Methotrexate injection 4 days ago. Has no complaints at this time.

## 2022-11-25 NOTE — ED STATDOCS - NS ED ROS FT
Constitutional: No fever or chills  Eyes: No visual changes  HEENT: No throat pain  CV: No chest pain  Resp: No SOB no cough  GI: No abd pain, nausea or vomiting  : No dysuria +vaginal bleeding  MSK: No musculoskeletal pain  Skin: No rash  Neuro: No headache

## 2022-11-25 NOTE — ED STATDOCS - CARE PROVIDER_API CALL
Иван Bragg)  Obstetrics and Gynecology  [address] [place] [address]  Phone: [phone]  Fax: [phone]  Follow Up Time: Urgent

## 2022-11-28 ENCOUNTER — APPOINTMENT (OUTPATIENT)
Dept: OBGYN | Facility: CLINIC | Age: 42
End: 2022-11-28

## 2022-11-28 PROCEDURE — 99213 OFFICE O/P EST LOW 20 MIN: CPT

## 2022-11-28 PROCEDURE — ZZZZZ: CPT

## 2022-11-28 NOTE — DISCUSSION/SUMMARY
[FreeTextEntry1] : Vaginal restrictions at this time. \par Will trend Hcg at this time. \par Patient to follow up with me in 2 weeks for final pelvic check and a sonogram. patient states she had a fibroid on US which is not noted in the final report. \par Weekly hcg. \par all of her questions were answered she is agreeable with plan.\par \par \par \par I Leonarda SALAZAR-C am scribing for the presence of Dr. Bragg the following sections HISTORY OF PRESENT ILLNESS, PAST MEDICAL/FAMILY/SOCIAL HISTORY; REVIEW OF SYSTEMS; VITAL SIGNS; PHYSICAL EXAM; DISPOSITION. \par \par \par I personally performed the services described in the documentation, reviewed the documentation recorded by the scribe in my presence and it accurately and completely records my words and actions.\par

## 2022-11-28 NOTE — CHIEF COMPLAINT
[Urgent Visit] : Urgent Visit [FreeTextEntry1] : Patient is a 43 y/o female here today for missed AB. She states this weekend her bleeding got much heavier since last thursday. Her hcg have been declining. She had one on thursday 1021 and saturday 845.

## 2022-11-28 NOTE — PHYSICAL EXAM
[Chaperone Present] : A chaperone was present in the examining room during all aspects of the physical examination [Labia Majora] : labia major [Labia Minora] : labia minora [Normal] : clitoris [Heavy] : there was heavy vaginal bleeding [FreeTextEntry1] : Leonarda SALAZAR-PAZ chaperoned during entire physical exam [FreeTextEntry5] : cervix closed

## 2022-11-29 ENCOUNTER — APPOINTMENT (OUTPATIENT)
Dept: OBGYN | Facility: CLINIC | Age: 42
End: 2022-11-29

## 2022-11-29 LAB — HCG SERPL-MCNC: 268 MIU/ML

## 2022-11-30 ENCOUNTER — APPOINTMENT (OUTPATIENT)
Dept: OBGYN | Facility: CLINIC | Age: 42
End: 2022-11-30

## 2022-12-01 NOTE — CHIEF COMPLAINT
[Urgent Visit] : Urgent Visit [FreeTextEntry1] : Patient is a 43 y/o female here today for hcg labs. She had a missed AB at around 6 weeks which was likely ectopic. She had methotrexate 7 days ago. her bleeding has eased up at this time.

## 2022-12-01 NOTE — DISCUSSION/SUMMARY
[FreeTextEntry1] : \par \par Will f/u with patient on hcg results. \par she has an apt next week for a follow up. \par all of her questions were answered she is agreeable with plan. \par \par \par \par I Leonarda JONESC am scribing for the presence of Dr. Bragg the following sections HISTORY OF PRESENT ILLNESS, PAST MEDICAL/FAMILY/SOCIAL HISTORY; REVIEW OF SYSTEMS; VITAL SIGNS; PHYSICAL EXAM; DISPOSITION. \par \par \par I personally performed the services described in the documentation, reviewed the documentation recorded by the scribe in my presence and it accurately and completely records my words and actions.\par

## 2022-12-05 ENCOUNTER — APPOINTMENT (OUTPATIENT)
Dept: ANTEPARTUM | Facility: CLINIC | Age: 42
End: 2022-12-05

## 2022-12-05 ENCOUNTER — APPOINTMENT (OUTPATIENT)
Dept: OBGYN | Facility: CLINIC | Age: 42
End: 2022-12-05

## 2022-12-07 ENCOUNTER — APPOINTMENT (OUTPATIENT)
Dept: OBGYN | Facility: CLINIC | Age: 42
End: 2022-12-07

## 2022-12-07 VITALS — HEART RATE: 64 BPM | DIASTOLIC BLOOD PRESSURE: 89 MMHG | SYSTOLIC BLOOD PRESSURE: 137 MMHG

## 2022-12-07 DIAGNOSIS — D25.9 LEIOMYOMA OF UTERUS, UNSPECIFIED: ICD-10-CM

## 2022-12-07 PROCEDURE — 99213 OFFICE O/P EST LOW 20 MIN: CPT

## 2022-12-08 ENCOUNTER — NON-APPOINTMENT (OUTPATIENT)
Age: 42
End: 2022-12-08

## 2022-12-08 LAB — HCG SERPL-MCNC: 4 MIU/ML

## 2022-12-08 NOTE — HISTORY OF PRESENT ILLNESS
[FreeTextEntry1] :  Patient is a 41 y/o female here today for hcg labs. She had a missed AB at around 6 weeks which was likely ectopic. She had methotrexate  2 weeks  ago. her bleeding has eased up at this time. \par \par she is here today for repeat hcg and a pelvic exam

## 2022-12-08 NOTE — PHYSICAL EXAM
[Chaperone Present] : A chaperone was present in the examining room during all aspects of the physical examination [Appropriately responsive] : appropriately responsive [Alert] : alert [Oriented x3] : oriented x3 [Labia Majora] : normal [Labia Minora] : normal [Normal] : normal [Uterine Adnexae] : normal [FreeTextEntry1] : Leonarda SALAZAR-PAZ chaperoned during entire physical exam [FreeTextEntry5] : closed

## 2022-12-08 NOTE — PLAN
[FreeTextEntry1] : \par \par No restrictions at this time\par due to her uterine fibroids she likely needs the fibroid to removed before getting pregnant. Advised her this will delay pregnancy about 6 months. \par advised it may take a month for ovulation to kick back in \par all of her questions were answered she is agreeable with plan.\par \par \par \par I Leonarda SALAZAR-C am scribing for the presence of Dr. Bragg the following sections HISTORY OF PRESENT ILLNESS, PAST MEDICAL/FAMILY/SOCIAL HISTORY; REVIEW OF SYSTEMS; VITAL SIGNS; PHYSICAL EXAM; DISPOSITION. \par \par \par I personally performed the services described in the documentation, reviewed the documentation recorded by the scribe in my presence and it accurately and completely records my words and actions.\par

## 2022-12-17 ENCOUNTER — APPOINTMENT (OUTPATIENT)
Dept: MRI IMAGING | Facility: CLINIC | Age: 42
End: 2022-12-17
Payer: COMMERCIAL

## 2022-12-17 ENCOUNTER — OUTPATIENT (OUTPATIENT)
Dept: OUTPATIENT SERVICES | Facility: HOSPITAL | Age: 42
LOS: 1 days | End: 2022-12-17
Payer: COMMERCIAL

## 2022-12-17 DIAGNOSIS — D25.9 LEIOMYOMA OF UTERUS, UNSPECIFIED: ICD-10-CM

## 2022-12-17 PROCEDURE — A9585: CPT

## 2022-12-17 PROCEDURE — 72197 MRI PELVIS W/O & W/DYE: CPT | Mod: 26

## 2022-12-17 PROCEDURE — 72197 MRI PELVIS W/O & W/DYE: CPT

## 2022-12-21 ENCOUNTER — APPOINTMENT (OUTPATIENT)
Dept: OBGYN | Facility: CLINIC | Age: 42
End: 2022-12-21

## 2022-12-21 PROCEDURE — 99214 OFFICE O/P EST MOD 30 MIN: CPT

## 2022-12-28 NOTE — PHYSICAL EXAM
[Chaperone Present] : A chaperone was present in the examining room during all aspects of the physical examination [Appropriately responsive] : appropriately responsive [Alert] : alert [Oriented x3] : oriented x3 [Labia Majora] : normal [Labia Minora] : normal [Normal] : normal [Uterine Adnexae] : normal [FreeTextEntry1] : Leonarda SALAZAR-PAZ chaperoned during entire physical exam

## 2022-12-28 NOTE — PLAN
[FreeTextEntry1] : \par \par discussed treatment for uterie fibroids. Due to patients desire to get pregnant at this time she would be a good candidate for single site myomectomy. \par Discussed in detail she would likely need a c section for delivery and she will delay pregnancy 6 months post operatively. \par Discussed risk of bleeding, infection, injury to bowel and bladder in detail. see scanned in image. \par patient to follow up with me for final decision for surgery pending covid test,  pst, and endometrial biopsy. \par \par all of her questions were answered she is agreeable with plan. \par \par \par \par I Leonarda SALAZAR-PAZ am scribing for the presence of Dr. Bragg the following sections HISTORY OF PRESENT ILLNESS, PAST MEDICAL/FAMILY/SOCIAL HISTORY; REVIEW OF SYSTEMS; VITAL SIGNS; PHYSICAL EXAM; DISPOSITION. \par \par \par I personally performed the services described in the documentation, reviewed the documentation recorded by the scribe in my presence and it accurately and completely records my words and actions.\par

## 2022-12-28 NOTE — HISTORY OF PRESENT ILLNESS
[FreeTextEntry1] : Patient is a 43 y/o female here today for consultation after pelvic MRI. Patient had MRI done to evaluate uterine fibroids. MRI showed two myomas. She is s/p ectopic pregnancy in november. \par one pedunculate left fundal leiomyoma measuring about 5.7 x  5.2  6.5 cm \par and a right subserosal leiomyoma measuring 4.2 x 3.3  x  4.0 cm \par \par thickened appearance of junctional zone suggestive of adenomyosis

## 2023-01-06 ENCOUNTER — APPOINTMENT (OUTPATIENT)
Dept: INTERNAL MEDICINE | Facility: CLINIC | Age: 43
End: 2023-01-06

## 2023-02-06 ENCOUNTER — APPOINTMENT (OUTPATIENT)
Dept: OBGYN | Facility: CLINIC | Age: 43
End: 2023-02-06
Payer: COMMERCIAL

## 2023-02-06 ENCOUNTER — NON-APPOINTMENT (OUTPATIENT)
Age: 43
End: 2023-02-06

## 2023-02-06 DIAGNOSIS — Z00.00 ENCOUNTER FOR GENERAL ADULT MEDICAL EXAMINATION W/OUT ABNORMAL FINDINGS: ICD-10-CM

## 2023-02-06 LAB
HCG UR QL: NEGATIVE
QUALITY CONTROL: YES

## 2023-02-06 PROCEDURE — 58100 BIOPSY OF UTERUS LINING: CPT

## 2023-02-06 PROCEDURE — 81025 URINE PREGNANCY TEST: CPT

## 2023-02-06 NOTE — PROCEDURE
[Endometrial Biopsy] : Endometrial biopsy [Risks] : risks [Benefits] : benefits [Alternatives] : alternatives [Patient] : patient [Infection] : infection [Bleeding] : bleeding [Allergic Reaction] : allergic reaction [Uterine Perforation] : uterine perforation [No Premedication] : No premedication [None] : none [Tenaculum] : Tenaculum [Required Dilation] : required dilation [Specimen Collected] : collected [Sent to Pathology] : placed in buffered formalin and sent for pathology [Tolerated Well] : Patient tolerated the procedure well [No Complications] : No complications

## 2023-02-09 ENCOUNTER — APPOINTMENT (OUTPATIENT)
Dept: INTERNAL MEDICINE | Facility: CLINIC | Age: 43
End: 2023-02-09

## 2023-02-12 NOTE — PLAN
[FreeTextEntry1] : 42 year old female presents for endometrial biopsy prior to myomectomy. \par Plan pending pathology report \par Pt agreeable with plan, all questions answered at this time. \par \par \par I Yolie HICKS am scribing for the presence of Dr. Bragg the following sections HISTORY OF PRESENT ILLNESS, PAST MEDICAL/FAMILY/SOCIAL HISTORY; REVIEW OF SYSTEMS; VITAL SIGNS; PHYSICAL EXAM; DISPOSITION. \par \par \par I personally performed the services described in the documentation, reviewed the documentation recorded by the scribe in my presence and it accurately and completely records my words and actions.\par

## 2023-02-13 ENCOUNTER — NON-APPOINTMENT (OUTPATIENT)
Age: 43
End: 2023-02-13

## 2023-02-13 LAB — CORE LAB BIOPSY: NORMAL

## 2023-02-22 ENCOUNTER — OUTPATIENT (OUTPATIENT)
Dept: OUTPATIENT SERVICES | Facility: HOSPITAL | Age: 43
LOS: 1 days | End: 2023-02-22
Payer: COMMERCIAL

## 2023-02-22 ENCOUNTER — APPOINTMENT (OUTPATIENT)
Dept: OBGYN | Facility: CLINIC | Age: 43
End: 2023-02-22
Payer: COMMERCIAL

## 2023-02-22 VITALS — HEART RATE: 67 BPM | TEMPERATURE: 96.3 F | SYSTOLIC BLOOD PRESSURE: 134 MMHG | DIASTOLIC BLOOD PRESSURE: 84 MMHG

## 2023-02-22 VITALS
TEMPERATURE: 98 F | DIASTOLIC BLOOD PRESSURE: 72 MMHG | RESPIRATION RATE: 16 BRPM | HEIGHT: 61 IN | OXYGEN SATURATION: 100 % | SYSTOLIC BLOOD PRESSURE: 113 MMHG | WEIGHT: 199.96 LBS | HEART RATE: 65 BPM

## 2023-02-22 DIAGNOSIS — Z90.89 ACQUIRED ABSENCE OF OTHER ORGANS: Chronic | ICD-10-CM

## 2023-02-22 DIAGNOSIS — Z98.890 OTHER SPECIFIED POSTPROCEDURAL STATES: Chronic | ICD-10-CM

## 2023-02-22 DIAGNOSIS — Z01.818 ENCOUNTER FOR OTHER PREPROCEDURAL EXAMINATION: ICD-10-CM

## 2023-02-22 LAB
A1C WITH ESTIMATED AVERAGE GLUCOSE RESULT: 5.6 % — SIGNIFICANT CHANGE UP (ref 4–5.6)
ANION GAP SERPL CALC-SCNC: 5 MMOL/L — SIGNIFICANT CHANGE UP (ref 5–17)
APPEARANCE UR: CLEAR — SIGNIFICANT CHANGE UP
BASOPHILS # BLD AUTO: 0.08 K/UL — SIGNIFICANT CHANGE UP (ref 0–0.2)
BASOPHILS NFR BLD AUTO: 0.9 % — SIGNIFICANT CHANGE UP (ref 0–2)
BILIRUB UR-MCNC: NEGATIVE — SIGNIFICANT CHANGE UP
BLD GP AB SCN SERPL QL: SIGNIFICANT CHANGE UP
BUN SERPL-MCNC: 19 MG/DL — SIGNIFICANT CHANGE UP (ref 7–23)
CALCIUM SERPL-MCNC: 8.6 MG/DL — SIGNIFICANT CHANGE UP (ref 8.5–10.1)
CHLORIDE SERPL-SCNC: 108 MMOL/L — SIGNIFICANT CHANGE UP (ref 96–108)
CO2 SERPL-SCNC: 23 MMOL/L — SIGNIFICANT CHANGE UP (ref 22–31)
COLOR SPEC: YELLOW — SIGNIFICANT CHANGE UP
CREAT SERPL-MCNC: 0.69 MG/DL — SIGNIFICANT CHANGE UP (ref 0.5–1.3)
DIFF PNL FLD: NEGATIVE — SIGNIFICANT CHANGE UP
EGFR: 112 ML/MIN/1.73M2 — SIGNIFICANT CHANGE UP
EOSINOPHIL # BLD AUTO: 0.15 K/UL — SIGNIFICANT CHANGE UP (ref 0–0.5)
EOSINOPHIL NFR BLD AUTO: 1.6 % — SIGNIFICANT CHANGE UP (ref 0–6)
ESTIMATED AVERAGE GLUCOSE: 114 MG/DL — SIGNIFICANT CHANGE UP (ref 68–114)
GLUCOSE SERPL-MCNC: 86 MG/DL — SIGNIFICANT CHANGE UP (ref 70–99)
GLUCOSE UR QL: NEGATIVE — SIGNIFICANT CHANGE UP
HCT VFR BLD CALC: 40.1 % — SIGNIFICANT CHANGE UP (ref 34.5–45)
HEMOGLOBIN: 13.9
HGB BLD-MCNC: 13.7 G/DL — SIGNIFICANT CHANGE UP (ref 11.5–15.5)
IMM GRANULOCYTES NFR BLD AUTO: 0.2 % — SIGNIFICANT CHANGE UP (ref 0–0.9)
KETONES UR-MCNC: NEGATIVE — SIGNIFICANT CHANGE UP
LEUKOCYTE ESTERASE UR-ACNC: NEGATIVE — SIGNIFICANT CHANGE UP
LYMPHOCYTES # BLD AUTO: 3.53 K/UL — HIGH (ref 1–3.3)
LYMPHOCYTES # BLD AUTO: 37.7 % — SIGNIFICANT CHANGE UP (ref 13–44)
MCHC RBC-ENTMCNC: 29 PG — SIGNIFICANT CHANGE UP (ref 27–34)
MCHC RBC-ENTMCNC: 34.2 GM/DL — SIGNIFICANT CHANGE UP (ref 32–36)
MCV RBC AUTO: 84.8 FL — SIGNIFICANT CHANGE UP (ref 80–100)
MONOCYTES # BLD AUTO: 0.52 K/UL — SIGNIFICANT CHANGE UP (ref 0–0.9)
MONOCYTES NFR BLD AUTO: 5.6 % — SIGNIFICANT CHANGE UP (ref 2–14)
NEUTROPHILS # BLD AUTO: 5.06 K/UL — SIGNIFICANT CHANGE UP (ref 1.8–7.4)
NEUTROPHILS NFR BLD AUTO: 54 % — SIGNIFICANT CHANGE UP (ref 43–77)
NITRITE UR-MCNC: NEGATIVE — SIGNIFICANT CHANGE UP
PH UR: 6 — SIGNIFICANT CHANGE UP (ref 5–8)
PLATELET # BLD AUTO: 317 K/UL — SIGNIFICANT CHANGE UP (ref 150–400)
POTASSIUM SERPL-MCNC: 4.7 MMOL/L — SIGNIFICANT CHANGE UP (ref 3.5–5.3)
POTASSIUM SERPL-SCNC: 4.7 MMOL/L — SIGNIFICANT CHANGE UP (ref 3.5–5.3)
PROT UR-MCNC: NEGATIVE — SIGNIFICANT CHANGE UP
RBC # BLD: 4.73 M/UL — SIGNIFICANT CHANGE UP (ref 3.8–5.2)
RBC # FLD: 12 % — SIGNIFICANT CHANGE UP (ref 10.3–14.5)
SODIUM SERPL-SCNC: 136 MMOL/L — SIGNIFICANT CHANGE UP (ref 135–145)
SP GR SPEC: 1.02 — SIGNIFICANT CHANGE UP (ref 1.01–1.02)
UROBILINOGEN FLD QL: NEGATIVE — SIGNIFICANT CHANGE UP
WBC # BLD: 9.36 K/UL — SIGNIFICANT CHANGE UP (ref 3.8–10.5)
WBC # FLD AUTO: 9.36 K/UL — SIGNIFICANT CHANGE UP (ref 3.8–10.5)

## 2023-02-22 PROCEDURE — 86850 RBC ANTIBODY SCREEN: CPT

## 2023-02-22 PROCEDURE — 83036 HEMOGLOBIN GLYCOSYLATED A1C: CPT

## 2023-02-22 PROCEDURE — 99214 OFFICE O/P EST MOD 30 MIN: CPT

## 2023-02-22 PROCEDURE — 99214 OFFICE O/P EST MOD 30 MIN: CPT | Mod: 25

## 2023-02-22 PROCEDURE — 81003 URINALYSIS AUTO W/O SCOPE: CPT

## 2023-02-22 PROCEDURE — 85018 HEMOGLOBIN: CPT | Mod: QW

## 2023-02-22 PROCEDURE — 86900 BLOOD TYPING SEROLOGIC ABO: CPT

## 2023-02-22 PROCEDURE — 36415 COLL VENOUS BLD VENIPUNCTURE: CPT

## 2023-02-22 PROCEDURE — 80048 BASIC METABOLIC PNL TOTAL CA: CPT

## 2023-02-22 PROCEDURE — 86901 BLOOD TYPING SEROLOGIC RH(D): CPT

## 2023-02-22 PROCEDURE — 85025 COMPLETE CBC W/AUTO DIFF WBC: CPT

## 2023-02-22 RX ORDER — OXYCODONE 5 MG/1
5 TABLET ORAL
Qty: 12 | Refills: 0 | Status: ACTIVE | COMMUNITY
Start: 2023-02-22 | End: 1900-01-01

## 2023-02-22 RX ADMIN — AZITHROMYCIN DIHYDRATE 0 MG: 250 TABLET, FILM COATED ORAL at 00:00

## 2023-02-22 NOTE — H&P PST ADULT - NSICDXPASTMEDICALHX_GEN_ALL_CORE_FT
PAST MEDICAL HISTORY:  Eczema     Fibroids     GERD (gastroesophageal reflux disease)     Seasonal allergies

## 2023-02-22 NOTE — H&P PST ADULT - HISTORY OF PRESENT ILLNESS
42 years old female with fibroids. A1. She had a miscarriage 2022.  Work up since then including transvaginal sonogram. Menorrhagia. Denies abdominal pain or irregular menstrual cycle. Planned robotic single site myomectomy.

## 2023-02-22 NOTE — H&P PST ADULT - ASSESSMENT
42 years old female present to PST prior to robotic single site myomectomy with Dr. Bragg.    Plan   1. NPO as per ASU  2. Urine pregnancy on the day of surgery  3. Use E-Z sponge as directed  4. Drink a quart of extra  fluids the day before your surgery.  5. CBC, BMP, HGB AIC, Urinalysis sent to lab

## 2023-02-22 NOTE — H&P PST ADULT - NSICDXPASTSURGICALHX_GEN_ALL_CORE_FT
PAST SURGICAL HISTORY:  History of ear surgery     History of rhinoplasty     History of tonsillectomy

## 2023-02-22 NOTE — H&P PST ADULT - NSICDXFAMILYHX_GEN_ALL_CORE_FT
FAMILY HISTORY:  Mother  Still living? Yes, Estimated age: 71-80  Family history of osteoporosis, Age at diagnosis: Age Unknown

## 2023-02-22 NOTE — H&P PST ADULT - NSANTHOSAYNRD_GEN_A_CORE
No. FLORINA screening performed.  STOP BANG Legend: 0-2 = LOW Risk; 3-4 = INTERMEDIATE Risk; 5-8 = HIGH Risk

## 2023-02-23 DIAGNOSIS — Z01.818 ENCOUNTER FOR OTHER PREPROCEDURAL EXAMINATION: ICD-10-CM

## 2023-02-24 ENCOUNTER — TRANSCRIPTION ENCOUNTER (OUTPATIENT)
Age: 43
End: 2023-02-24

## 2023-02-24 ENCOUNTER — APPOINTMENT (OUTPATIENT)
Dept: OBGYN | Facility: HOSPITAL | Age: 43
End: 2023-02-24

## 2023-02-24 ENCOUNTER — RESULT REVIEW (OUTPATIENT)
Age: 43
End: 2023-02-24

## 2023-02-24 ENCOUNTER — OUTPATIENT (OUTPATIENT)
Dept: INPATIENT UNIT | Facility: HOSPITAL | Age: 43
LOS: 1 days | Discharge: HOME | End: 2023-02-24
Payer: COMMERCIAL

## 2023-02-24 VITALS
OXYGEN SATURATION: 99 % | WEIGHT: 190.04 LBS | DIASTOLIC BLOOD PRESSURE: 85 MMHG | RESPIRATION RATE: 16 BRPM | SYSTOLIC BLOOD PRESSURE: 137 MMHG | TEMPERATURE: 99 F | HEIGHT: 61 IN | HEART RATE: 65 BPM

## 2023-02-24 DIAGNOSIS — Z98.890 OTHER SPECIFIED POSTPROCEDURAL STATES: Chronic | ICD-10-CM

## 2023-02-24 DIAGNOSIS — D25.9 LEIOMYOMA OF UTERUS, UNSPECIFIED: ICD-10-CM

## 2023-02-24 DIAGNOSIS — Z90.89 ACQUIRED ABSENCE OF OTHER ORGANS: Chronic | ICD-10-CM

## 2023-02-24 LAB — HCG UR QL: NEGATIVE — SIGNIFICANT CHANGE UP

## 2023-02-24 PROCEDURE — C1889: CPT

## 2023-02-24 PROCEDURE — 88305 TISSUE EXAM BY PATHOLOGIST: CPT

## 2023-02-24 PROCEDURE — 81025 URINE PREGNANCY TEST: CPT

## 2023-02-24 PROCEDURE — 58545 LAPAROSCOPIC MYOMECTOMY: CPT | Mod: AS

## 2023-02-24 PROCEDURE — 88305 TISSUE EXAM BY PATHOLOGIST: CPT | Mod: 26

## 2023-02-24 PROCEDURE — C9399: CPT

## 2023-02-24 PROCEDURE — C1765: CPT

## 2023-02-24 PROCEDURE — S2900: CPT

## 2023-02-24 PROCEDURE — 58545 LAPAROSCOPIC MYOMECTOMY: CPT

## 2023-02-24 RX ORDER — SODIUM CHLORIDE 9 MG/ML
1000 INJECTION, SOLUTION INTRAVENOUS
Refills: 0 | Status: DISCONTINUED | OUTPATIENT
Start: 2023-02-24 | End: 2023-02-24

## 2023-02-24 RX ORDER — OXYCODONE AND ACETAMINOPHEN 5; 325 MG/1; MG/1
1 TABLET ORAL EVERY 4 HOURS
Refills: 0 | Status: DISCONTINUED | OUTPATIENT
Start: 2023-02-25 | End: 2023-02-25

## 2023-02-24 RX ORDER — OXYCODONE AND ACETAMINOPHEN 5; 325 MG/1; MG/1
2 TABLET ORAL EVERY 4 HOURS
Refills: 0 | Status: DISCONTINUED | OUTPATIENT
Start: 2023-02-25 | End: 2023-02-25

## 2023-02-24 RX ORDER — ACETAMINOPHEN 500 MG
1000 TABLET ORAL ONCE
Refills: 0 | Status: COMPLETED | OUTPATIENT
Start: 2023-02-24 | End: 2023-02-24

## 2023-02-24 RX ORDER — ACETAMINOPHEN 500 MG
650 TABLET ORAL EVERY 4 HOURS
Refills: 0 | Status: DISCONTINUED | OUTPATIENT
Start: 2023-02-25 | End: 2023-02-25

## 2023-02-24 RX ORDER — CELECOXIB 200 MG/1
400 CAPSULE ORAL ONCE
Refills: 0 | Status: COMPLETED | OUTPATIENT
Start: 2023-02-24 | End: 2023-02-24

## 2023-02-24 RX ORDER — GABAPENTIN 400 MG/1
600 CAPSULE ORAL ONCE
Refills: 0 | Status: COMPLETED | OUTPATIENT
Start: 2023-02-24 | End: 2023-02-24

## 2023-02-24 RX ORDER — FENTANYL CITRATE 50 UG/ML
50 INJECTION INTRAVENOUS
Refills: 0 | Status: DISCONTINUED | OUTPATIENT
Start: 2023-02-24 | End: 2023-02-24

## 2023-02-24 RX ORDER — SODIUM CHLORIDE 9 MG/ML
1000 INJECTION, SOLUTION INTRAVENOUS
Refills: 0 | Status: DISCONTINUED | OUTPATIENT
Start: 2023-02-25 | End: 2023-02-25

## 2023-02-24 RX ORDER — IBUPROFEN 200 MG
600 TABLET ORAL EVERY 6 HOURS
Refills: 0 | Status: DISCONTINUED | OUTPATIENT
Start: 2023-02-25 | End: 2023-02-25

## 2023-02-24 RX ORDER — ONDANSETRON 8 MG/1
4 TABLET, FILM COATED ORAL EVERY 6 HOURS
Refills: 0 | Status: DISCONTINUED | OUTPATIENT
Start: 2023-02-25 | End: 2023-02-25

## 2023-02-24 RX ORDER — HYDROMORPHONE HYDROCHLORIDE 2 MG/ML
1 INJECTION INTRAMUSCULAR; INTRAVENOUS; SUBCUTANEOUS
Refills: 0 | Status: DISCONTINUED | OUTPATIENT
Start: 2023-02-24 | End: 2023-02-24

## 2023-02-24 RX ORDER — AZITHROMYCIN 250 MG/1
250 TABLET, FILM COATED ORAL
Qty: 1 | Refills: 0 | Status: ACTIVE | COMMUNITY
Start: 2023-02-24 | End: 1900-01-01

## 2023-02-24 RX ORDER — OXYCODONE HYDROCHLORIDE 5 MG/1
10 TABLET ORAL ONCE
Refills: 0 | Status: DISCONTINUED | OUTPATIENT
Start: 2023-02-24 | End: 2023-02-24

## 2023-02-24 RX ADMIN — FENTANYL CITRATE 50 MICROGRAM(S): 50 INJECTION INTRAVENOUS at 20:37

## 2023-02-24 RX ADMIN — CELECOXIB 400 MILLIGRAM(S): 200 CAPSULE ORAL at 20:10

## 2023-02-24 RX ADMIN — CELECOXIB 400 MILLIGRAM(S): 200 CAPSULE ORAL at 13:53

## 2023-02-24 RX ADMIN — Medication 1000 MILLIGRAM(S): at 20:10

## 2023-02-24 RX ADMIN — GABAPENTIN 600 MILLIGRAM(S): 400 CAPSULE ORAL at 13:54

## 2023-02-24 RX ADMIN — OXYCODONE HYDROCHLORIDE 10 MILLIGRAM(S): 5 TABLET ORAL at 20:41

## 2023-02-24 RX ADMIN — FENTANYL CITRATE 50 MICROGRAM(S): 50 INJECTION INTRAVENOUS at 20:12

## 2023-02-24 RX ADMIN — Medication 1000 MILLIGRAM(S): at 13:53

## 2023-02-24 NOTE — ASU DISCHARGE PLAN (ADULT/PEDIATRIC) - NS MD DC FALL RISK RISK
For information on Fall & Injury Prevention, visit: [email or web address] OR  [email or web address] OR  https://www.cdc.gov/steadi/patient.html

## 2023-02-24 NOTE — PHYSICAL EXAM
[Chaperone Present] : A chaperone was present in the examining room during all aspects of the physical examination [Appropriately responsive] : appropriately responsive [Alert] : alert [No Acute Distress] : no acute distress [No Lymphadenopathy] : no lymphadenopathy [Regular Rate Rhythm] : regular rate rhythm [No Murmurs] : no murmurs [Clear to Auscultation B/L] : clear to auscultation bilaterally [Soft] : soft [Non-tender] : non-tender [Non-distended] : non-distended [No HSM] : No HSM [No Lesions] : no lesions [No Mass] : no mass [Oriented x3] : oriented x3 [Labia Majora] : normal [Labia Minora] : normal [Normal] : normal [Uterine Adnexae] : normal [Enlarged ___ wks] : enlarged [unfilled] ~Uweeks [FreeTextEntry1] : KAREN AlejandroC

## 2023-02-24 NOTE — BRIEF OPERATIVE NOTE - NSICDXBRIEFPOSTOP_GEN_ALL_CORE_FT
POST-OP DIAGNOSIS:  Fibroid uterus 24-Feb-2023 18:29:06  Иван Bragg   POST-OP DIAGNOSIS:  Fibroid uterus 24-Feb-2023 18:29:06  Иван Bragg  Adenomyosis 24-Feb-2023 19:22:15  Иван Bragg

## 2023-02-24 NOTE — BRIEF OPERATIVE NOTE - NSICDXBRIEFPROCEDURE_GEN_ALL_CORE_FT
PROCEDURES:  Exam under anesthesia, gynecologic 24-Feb-2023 18:27:27  Иван Bragg  Robot-assisted single incision pelviscopy 24-Feb-2023 18:27:56  Иван Bragg  Myomectomy, robot-assisted, laparoscopic, 1 to 4 myomas 24-Feb-2023 18:28:11 Иван Adamson

## 2023-02-24 NOTE — ASU PREOP CHECKLIST - AS BP NONINV METHOD
What Type Of Note Output Would You Prefer (Optional)?: Standard Output Hpi Title: Evaluation of Skin Lesions How Severe Are Your Spot(S)?: moderate Have Your Spot(S) Been Treated In The Past?: has not been treated electronic

## 2023-02-24 NOTE — ASU DISCHARGE PLAN (ADULT/PEDIATRIC) - CARE PROVIDER_API CALL
Иван Bragg)  Obstetrics and Gynecology  [address] [place] [address]  Phone: [phone]  Fax: [phone]  Follow Up Time: 2 weeks

## 2023-02-24 NOTE — HISTORY OF PRESENT ILLNESS
[FreeTextEntry1] : Patient here for final decision for surgery. plan for single site myomectomy. \par Discussed in detail she would likely need a c section for delivery and she will delay pregnancy 6 months post operatively. hx- ectopic pregnancy

## 2023-02-24 NOTE — DISCUSSION/SUMMARY
[FreeTextEntry1] : Discussed pre op and post op instructions in detail.\par Vaginal restrictions only for 6 weeks- no orgasms for 6 weeks.  \par Will delay conception for 6 months post op. \par all of patients questions regarding procedure were answered.\par consent signed by MD, patient and witness.\par she is to f/u with me 2 weeks post operatively. she is agreeable with plan.\par z-saira & oxy ordered today\par \par \par \par I Yolie SALAZAR-C am scribing for the presence of Dr. Bragg the following sections HISTORY OF PRESENT ILLNESS, PAST MEDICAL/FAMILY/SOCIAL HISTORY; REVIEW OF SYSTEMS; VITAL SIGNS; PHYSICAL EXAM; DISPOSITION. \par \par \par I personally performed the services described in the documentation, reviewed the documentation recorded by the scribe in my presence and it accurately and completely records my words and actions.\par

## 2023-02-24 NOTE — ASU DISCHARGE PLAN (ADULT/PEDIATRIC) - NURSING INSTRUCTIONS
Call MD for any increase in pain, nausea, vomiting; fever over 101F; swelling, redness, oozing at incision site; pain in calf. No creams, gels, or ointments on incisions. No swimming, hot tubs or bathing; do not submerge incision.     Next Ibuprofen available at 1200

## 2023-02-24 NOTE — ASU PATIENT PROFILE, ADULT - FALL HARM RISK - UNIVERSAL INTERVENTIONS
Bed in lowest position, wheels locked, appropriate side rails in place/Call bell, personal items and telephone in reach/Instruct patient to call for assistance before getting out of bed or chair/Non-slip footwear when patient is out of bed/[place] to call system/Physically safe environment - no spills, clutter or unnecessary equipment/Purposeful Proactive Rounding/Room/bathroom lighting operational, light cord in reach

## 2023-02-25 VITALS
OXYGEN SATURATION: 99 % | SYSTOLIC BLOOD PRESSURE: 119 MMHG | HEART RATE: 83 BPM | DIASTOLIC BLOOD PRESSURE: 63 MMHG | RESPIRATION RATE: 18 BRPM | TEMPERATURE: 99 F

## 2023-02-25 RX ORDER — CEFAZOLIN SODIUM 1 G
2000 VIAL (EA) INJECTION ONCE
Refills: 0 | Status: COMPLETED | OUTPATIENT
Start: 2023-02-25 | End: 2023-02-25

## 2023-02-25 RX ADMIN — Medication 600 MILLIGRAM(S): at 05:04

## 2023-02-25 RX ADMIN — Medication 650 MILLIGRAM(S): at 00:35

## 2023-02-25 RX ADMIN — Medication 650 MILLIGRAM(S): at 01:05

## 2023-02-25 NOTE — PROGRESS NOTE ADULT - ASSESSMENT
A/P: 42y now POD#1 s/p RA-SS myomectomy  Neuro: Pain well controlled. Continue current pain regimen.  CV: No history of cardiovascular disease. Blood pressure well controlled.  Pulm: No active disease. Saturating well on room air. Incentive spirometer use encouraged  GI: No active disease. Bowel sounds and function normal, tolerating PO diet. Continue current bowel regimen.   : voiding spontaneously.  Heme: Hgb stable  ID: Afebrile. No antibiotics indicated at this time.   DVT ppx: Ambulation encouraged, SCDs when in bed, lovenox ordered.  Dispo: Pt to be discharged home, has follow up on 3/8/23 with Dr. Bragg

## 2023-02-25 NOTE — PROGRESS NOTE ADULT - SUBJECTIVE AND OBJECTIVE BOX
AN YBARRA is a 42y now POD#1 s/p RA-SS myomectomy    S:    No acute events overnight.   Patient was seen and examined at bedside.   Patient has no complaints this AM.   Pain is well controlled with current treatment regimen.   Tolerating regular diet, denies N/V.   Ambulating without difficulty.   + flatus/-BM/+ voiding  She denies lightheadedness, dizziness, palpitations, chest pain and SOB.     O:   T(C): 37.2 (02-25-23 @ 07:55), Max: 37.6 (02-24-23 @ 21:00)  HR: 83 (02-25-23 @ 07:55) (65 - 84)  BP: 119/63 (02-25-23 @ 07:55) (119/63 - 157/86)  RR: 18 (02-25-23 @ 07:55) (14 - 18)  SpO2: 99% (02-25-23 @ 07:55) (92% - 99%)    Gen: NAD, AAOx3  CV: RRR, S1 S2 present  Pulm: CTAB  Abdomen: Soft, nondistended, appropriately tender, + BS   Pelvic: Pad inspected with minimal amount of dark red blood   Incision: Dressing saturated serosanguinous fluid, gauze removed and replaced with new tegaderm.  Extremities: No calf tenderness or edema     Labs:       02-24-23 @ 07:01  -  02-25-23 @ 07:00  --------------------------------------------------------  IN: 2125 mL / OUT: 800 mL / NET: 1325 mL

## 2023-02-28 LAB — SURGICAL PATHOLOGY STUDY: SIGNIFICANT CHANGE UP

## 2023-03-02 ENCOUNTER — NON-APPOINTMENT (OUTPATIENT)
Age: 43
End: 2023-03-02

## 2023-03-02 DIAGNOSIS — Z87.891 PERSONAL HISTORY OF NICOTINE DEPENDENCE: ICD-10-CM

## 2023-03-02 DIAGNOSIS — D25.9 LEIOMYOMA OF UTERUS, UNSPECIFIED: ICD-10-CM

## 2023-03-02 DIAGNOSIS — N80.03 ADENOMYOSIS OF THE UTERUS: ICD-10-CM

## 2023-03-10 ENCOUNTER — APPOINTMENT (OUTPATIENT)
Dept: OBGYN | Facility: CLINIC | Age: 43
End: 2023-03-10
Payer: COMMERCIAL

## 2023-03-10 VITALS — SYSTOLIC BLOOD PRESSURE: 131 MMHG | HEART RATE: 59 BPM | DIASTOLIC BLOOD PRESSURE: 82 MMHG

## 2023-03-10 PROBLEM — L30.9 DERMATITIS, UNSPECIFIED: Chronic | Status: ACTIVE | Noted: 2023-02-22

## 2023-03-10 PROBLEM — J30.2 OTHER SEASONAL ALLERGIC RHINITIS: Chronic | Status: ACTIVE | Noted: 2023-02-22

## 2023-03-10 PROBLEM — D21.9 BENIGN NEOPLASM OF CONNECTIVE AND OTHER SOFT TISSUE, UNSPECIFIED: Chronic | Status: ACTIVE | Noted: 2023-02-22

## 2023-03-10 PROBLEM — K21.9 GASTRO-ESOPHAGEAL REFLUX DISEASE WITHOUT ESOPHAGITIS: Chronic | Status: ACTIVE | Noted: 2023-02-22

## 2023-03-10 LAB — HEMOGLOBIN: 12

## 2023-03-10 PROCEDURE — 99024 POSTOP FOLLOW-UP VISIT: CPT

## 2023-03-10 PROCEDURE — 85018 HEMOGLOBIN: CPT | Mod: QW

## 2023-03-10 NOTE — HISTORY OF PRESENT ILLNESS
[Pain is well-controlled] : pain is well-controlled [Clean/Dry/Intact] : clean, dry and intact [Mild] : mild vaginal bleeding [Pathology reviewed] : pathology reviewed [Fever] : no fever [Chills] : no chills [Nausea] : no nausea [Diarrhea] : no diarrhea [Vaginal Bleeding] : no vaginal bleeding [Pelvic Pressure] : no pelvic pressure [Vaginal Discharge] : no vaginal discharge [Constipation] : no constipation [Erythema] : not erythematous [Swelling] : not swollen [de-identified] : Patient is a 43 yo female here today for post op visit. SHe is s/p single site myomectomy on  2/24/23 for fibroid uterus.

## 2023-03-10 NOTE — PLAN
[No Herrick] : to avoid sexual intercourse [No Tampons/Suppositories] : against using tampons or vaginal suppositories [FreeTextEntry1] : \par Surgical photos and pathology reviewed with patient today\par advised heat for healing on belly button incision.\par she is to call me if she has any questions or concerns regarding her procedure\par she is to follow up with me in 4 weeks.\par all of her questions were answered she is agreeable with plan \par \par \par \par I Leonarda SALAZAR-C am scribing for the presence of Dr. Bragg the following sections HISTORY OF PRESENT ILLNESS, PAST MEDICAL/FAMILY/SOCIAL HISTORY; REVIEW OF SYSTEMS; VITAL SIGNS; PHYSICAL EXAM; DISPOSITION. \par \par \par I personally performed the services described in the documentation, reviewed the documentation recorded by the scribe in my presence and it accurately and completely records my words and actions.\par

## 2023-04-03 ENCOUNTER — APPOINTMENT (OUTPATIENT)
Dept: OTOLARYNGOLOGY | Facility: CLINIC | Age: 43
End: 2023-04-03

## 2023-04-06 NOTE — ED STATDOCS - ATTENDING CONTRIBUTION TO CARE
I,Moses Farley MD,  performed the initial face to face bedside interview with this patient regarding history of present illness, review of symptoms and relevant past medical, social and family history.  I completed an independent physical examination.  I was the initial provider who evaluated this patient. I have signed out the follow up of any pending tests (i.e. labs, radiological studies) to the ACP.  I have communicated the patient’s plan of care and disposition with the ACP.  The history, relevant review of systems, past medical and surgical history, medical decision making, and physical examination was documented by the scribe in my presence and I attest to the accuracy of the documentation.
No

## 2023-04-18 ENCOUNTER — APPOINTMENT (OUTPATIENT)
Dept: OBGYN | Facility: CLINIC | Age: 43
End: 2023-04-18
Payer: COMMERCIAL

## 2023-04-18 VITALS
DIASTOLIC BLOOD PRESSURE: 89 MMHG | HEIGHT: 61 IN | WEIGHT: 195 LBS | HEART RATE: 61 BPM | BODY MASS INDEX: 36.82 KG/M2 | SYSTOLIC BLOOD PRESSURE: 131 MMHG

## 2023-04-18 DIAGNOSIS — Z30.41 ENCOUNTER FOR SURVEILLANCE OF CONTRACEPTIVE PILLS: ICD-10-CM

## 2023-04-18 LAB — HEMOGLOBIN: 12.3

## 2023-04-18 PROCEDURE — 85018 HEMOGLOBIN: CPT | Mod: QW

## 2023-04-18 PROCEDURE — 99024 POSTOP FOLLOW-UP VISIT: CPT

## 2023-04-18 RX ORDER — NORETHINDRONE ACETATE AND ETHINYL ESTRADIOL 1.5; 3 MG/1; UG/1
1.5-3 TABLET ORAL DAILY
Qty: 3 | Refills: 3 | Status: ACTIVE | COMMUNITY
Start: 2023-04-18 | End: 1900-01-01

## 2023-04-18 NOTE — HISTORY OF PRESENT ILLNESS
[Pain is well-controlled] : pain is well-controlled [None] : no vaginal bleeding [Normal] : normal [Fever] : no fever [Chills] : no chills [Nausea] : no nausea [Vomiting] : no vomiting [Diarrhea] : no diarrhea [Vaginal Bleeding] : no vaginal bleeding [Pelvic Pressure] : no pelvic pressure [Dysuria] : no dysuria [Vaginal Discharge] : no vaginal discharge [Constipation] : no constipation [de-identified] : s/p single site myomectomy on 2/24/2023\par

## 2023-04-18 NOTE — PLAN
[FreeTextEntry1] : Reviewed pt will likely need a c section for delivery and she will delay pregnancy 6 months post operatively. Delay until September. \par Pt to f/u in July or August for her annual.\par OCPs for BC. Discussed this can prevent new fibroids. Plan to use until September. Pt to use a back up method for first 2 weeks. \par No restrictions\par Pt to call with heavy bleeding or pain not controlled with NSAIDs\par Supportive care measure discussed. \par All questions answered, pt agreeable with plan. \par \par Pt discussed she is interested in losing weight. Advised if she is interested in conceiving; prefer metformin over ozempic; evan if she is borderline diabetic. Advised PCP will likely suggest diet & exercise prior to medications. Pt states she has began walking & is looking into a more strenuous exercise regimen. \par \par Discussed fertility but not convinced she will need a fertility specialist at this time. Cycle is 25-28 days. \par \par \par I Yolie HICKS am scribing for the presence of Dr. Bragg the following sections HISTORY OF PRESENT ILLNESS, PAST MEDICAL/FAMILY/SOCIAL HISTORY; REVIEW OF SYSTEMS; VITAL SIGNS; PHYSICAL EXAM; DISPOSITION. \par \par \par I personally performed the services described in the documentation, reviewed the documentation recorded by the scribe in my presence and it accurately and completely records my words and actions.\par \par \par

## 2023-05-14 NOTE — ED ADULT NURSE NOTE - NSIMPLEMENTINTERV_GEN_ALL_ED
Implemented All Universal Safety Interventions:  [place] to call system. Call bell, personal items and telephone within reach. Instruct patient to call for assistance. Room bathroom lighting operational. Non-slip footwear when patient is off stretcher. Physically safe environment: no spills, clutter or unnecessary equipment. Stretcher in lowest position, wheels locked, appropriate side rails in place. Yes

## 2023-06-13 NOTE — ED ADULT NURSE NOTE - NSFALLRSKINDICATORS_ED_ALL_ED
Go for blood tests as directed. Your doctor will do lab tests at regular visits to monitor the effects of this medicine. Please follow up with your doctor and keep your health care provider appointments. no

## 2023-07-06 ENCOUNTER — NON-APPOINTMENT (OUTPATIENT)
Age: 43
End: 2023-07-06

## 2023-07-06 ENCOUNTER — APPOINTMENT (OUTPATIENT)
Dept: ORTHOPEDIC SURGERY | Facility: CLINIC | Age: 43
End: 2023-07-06
Payer: COMMERCIAL

## 2023-07-06 DIAGNOSIS — M76.821 POSTERIOR TIBIAL TENDINITIS, RIGHT LEG: ICD-10-CM

## 2023-07-06 DIAGNOSIS — M21.42 FLAT FOOT [PES PLANUS] (ACQUIRED), RIGHT FOOT: ICD-10-CM

## 2023-07-06 DIAGNOSIS — M25.572 PAIN IN LEFT ANKLE AND JOINTS OF LEFT FOOT: ICD-10-CM

## 2023-07-06 DIAGNOSIS — M76.822 POSTERIOR TIBIAL TENDINITIS, RIGHT LEG: ICD-10-CM

## 2023-07-06 DIAGNOSIS — M21.41 FLAT FOOT [PES PLANUS] (ACQUIRED), RIGHT FOOT: ICD-10-CM

## 2023-07-06 PROCEDURE — 73610 X-RAY EXAM OF ANKLE: CPT | Mod: LT

## 2023-07-06 PROCEDURE — 99213 OFFICE O/P EST LOW 20 MIN: CPT

## 2023-07-06 RX ORDER — MELOXICAM 15 MG/1
15 TABLET ORAL DAILY
Qty: 30 | Refills: 1 | Status: ACTIVE | COMMUNITY
Start: 2023-07-06 | End: 1900-01-01

## 2023-07-06 RX ORDER — DICLOFENAC SODIUM 1% 10 MG/G
1 GEL TOPICAL
Qty: 100 | Refills: 3 | Status: ACTIVE | COMMUNITY
Start: 2023-07-06 | End: 1900-01-01

## 2023-07-06 NOTE — DISCUSSION/SUMMARY
[de-identified] : I had a long discussion with the patient's continued left ankle pain that has been chronic.  She does have a flatfoot.  She is having a lot of pain on the outside portion of the ankle in the sinus tarsi region.  I do want to proceed with a left ankle MRI without contrast to evaluate the lateral aspect of the ankle as well as the posterior tibialis tendon for possible tearing versus even a rupture.  I placed the patient in an ASO brace for support.  I ordered her custom orthotics.  I explained to her proper shoe wear with good arch supports is extremely important.  I prescribed her diclofenac sodium gel 1% as well as meloxicam 15 mg to use and to take as directed as needed with food.  Continue with ice and elevation.  Once the MRI is completed, the patient knows the MRI needs to be authorized by her insurance company, the patient return to office to meet with Dr. Ayala to discuss.  All of the patient's questions were answered.  She understood and agreed to the treatment course.

## 2023-07-06 NOTE — PHYSICAL EXAM
[de-identified] : General: Alert and oriented x3. In no acute distress. Pleasant in nature with a normal affect. No apparent respiratory distress.\par \par L Ankle Exam\par Skin: Clean, dry, intact\par Inspection: No obvious malalignment, no swelling, no effusion; no lymphadenopathy\par Pulses: 2+ DP/PT pulses\par ROM: L Ankle 10 degrees of dorsiflexion, 40 degrees of plantarflexion, 10 degrees of subtalar motion\par Tenderness: +posterior peroneals, +posterior tibial tendon. No tenderness over the lateral malleolus, no CFL/ATFL/PTFL pain. No medial malleolus pain, no deltoid ligament pain. No proximal fibular pain. No heel pain.\par Stability: Negative anterior/posterior drawer.\par Strength: 5/5 TA/GS/EHL\par Neuro: In tact to light touch throughout\par Additional tests: Negative Mortons test, Negative syndesmosis squeeze test. \par \par Patient has tenderness at the sinus tarsi consistent with lateral impingement syndrome from the flat foot deformity.\par \par BL Standing Exam\par +pes planus [de-identified] : 3V of the left ankle were ordered obtained and reviewed by me today, 7/6/23, revealed: No fracture. Posterior tibial tendon dysfunction. Pes planus.

## 2023-07-06 NOTE — HISTORY OF PRESENT ILLNESS
[FreeTextEntry1] : The patient is a 43-year-old female who presents with acute on chronic left ankle pain.  Over the past 6 weeks the pain on the medial side and lateral side of the ankle has been severe.  She states that she woke up yesterday with 10 out of 10 pain.  There was no trauma associated to her ankle pain.  In the past, October 2020, we saw her for her ankle and diagnosed with posterior tibial tendon dysfunction.  I do believe that this is stemming from her flatfoot deformity.  She does have some mild swelling of the ankle today.  She has been using NSAIDs for the past 4 weeks without pain relief.  She is walking with a limp.  She has a  for exercising and she states that over the past 4 weeks working with her  she is very limited due to her left ankle which is very concerning to her.  Working out is actually causing her more pain.  She has no numbness or tingling the ankle.  No other complaints.

## 2023-07-14 ENCOUNTER — APPOINTMENT (OUTPATIENT)
Dept: OBGYN | Facility: CLINIC | Age: 43
End: 2023-07-14
Payer: COMMERCIAL

## 2023-07-14 ENCOUNTER — RESULT CHARGE (OUTPATIENT)
Age: 43
End: 2023-07-14

## 2023-07-14 VITALS
HEIGHT: 61 IN | DIASTOLIC BLOOD PRESSURE: 82 MMHG | HEART RATE: 64 BPM | BODY MASS INDEX: 35.87 KG/M2 | SYSTOLIC BLOOD PRESSURE: 119 MMHG | WEIGHT: 190 LBS

## 2023-07-14 LAB
BILIRUB UR QL STRIP: NORMAL
CLARITY UR: CLEAR
COLLECTION METHOD: NORMAL
DATE COLLECTED: NORMAL
GLUCOSE UR-MCNC: NORMAL
HCG UR QL: 0.2 EU/DL
HEMOCCULT SP1 STL QL: NEGATIVE
HEMOGLOBIN: 12.3
HGB UR QL STRIP.AUTO: NORMAL
KETONES UR-MCNC: NORMAL
LEUKOCYTE ESTERASE UR QL STRIP: NORMAL
NITRITE UR QL STRIP: NORMAL
PH UR STRIP: 5.5
PROT UR STRIP-MCNC: NORMAL
QUALITY CONTROL: YES
SP GR UR STRIP: 1.03

## 2023-07-14 PROCEDURE — 81003 URINALYSIS AUTO W/O SCOPE: CPT | Mod: QW

## 2023-07-14 PROCEDURE — 99396 PREV VISIT EST AGE 40-64: CPT

## 2023-07-14 PROCEDURE — 82270 OCCULT BLOOD FECES: CPT

## 2023-07-14 PROCEDURE — 85018 HEMOGLOBIN: CPT | Mod: QW

## 2023-07-14 RX ORDER — ASCORBIC ACID, CHOLECALCIFEROL, .ALPHA.-TOCOPHEROL ACETATE, DL-, PYRIDOXINE, FOLIC ACID, CYANOCOBALAMIN, CALCIUM, FERROUS FUMARATE, MAGNESIUM, DOCONEXENT 85; 200; 10; 25; 1; 12; 140; 27; 45; 300 [IU]/1; [IU]/1; [IU]/1; [IU]/1; MG/1; UG/1; MG/1; MG/1; MG/1; MG/1
27-0.6-0.4-3 CAPSULE, GELATIN COATED ORAL
Qty: 90 | Refills: 3 | Status: ACTIVE | COMMUNITY
Start: 2022-11-17 | End: 1900-01-01

## 2023-07-14 NOTE — HISTORY OF PRESENT ILLNESS
[TextBox_4] : 43 year old female presents for her annual visit. Denies GYN complaints at this time.\par PMH: single site myomectomy (2/2023), ectopic pregnancy treated with methotrexate. OCPs for BC. \par

## 2023-07-14 NOTE — PHYSICAL EXAM
[Chaperone Present] : A chaperone was present in the examining room during all aspects of the physical examination [Appropriately responsive] : appropriately responsive [Alert] : alert [No Acute Distress] : no acute distress [No Lymphadenopathy] : no lymphadenopathy [Soft] : soft [Non-tender] : non-tender [Non-distended] : non-distended [No HSM] : No HSM [No Lesions] : no lesions [No Mass] : no mass [Oriented x3] : oriented x3 [Examination Of The Breasts] : a normal appearance [No Masses] : no breast masses were palpable [Labia Majora] : normal [Labia Minora] : normal [Normal] : normal [Uterine Adnexae] : normal [Normal rectal exam] : was normal [FreeTextEntry1] : KAREN AlejandroC [Occult Blood Positive] : was negative for occult blood analysis

## 2023-07-14 NOTE — PLAN
[FreeTextEntry1] : \par \par continue OCPs until september- she is cleared to conceive \par discussed she will need  for delivery\par she has hx of ectopic pregnancy - we will plan for an early sonogram at 5-6 weeks once she is pregnant to r/o ectopic\par discussed having sex day 9 -day 15 based on her cycle when she is trying to conceive\par to start PNV once she stops OCPs in september; Rx given\par Patient to follow up in 1 year for annual GYN exam\par Mammogram due: now; Rx given\par Colonoscopy due: 45\par Bone density due: PM\par Pap ordered\par Hemoccult ordered\par All questions answered, patient is agreeable with plan.\par \par I Yolie HICKS am scribing for the presence of Dr. Bragg the following sections HISTORY OF PRESENT ILLNESS, PAST MEDICAL/FAMILY/SOCIAL HISTORY; REVIEW OF SYSTEMS; VITAL SIGNS; PHYSICAL EXAM; DISPOSITION. \par \par \par I personally performed the services described in the documentation, reviewed the documentation recorded by the scribe in my presence and it accurately and completely records my words and actions.\par \par

## 2023-07-18 LAB — CYTOLOGY CVX/VAG DOC THIN PREP: NORMAL

## 2023-07-29 ENCOUNTER — APPOINTMENT (OUTPATIENT)
Dept: MRI IMAGING | Facility: CLINIC | Age: 43
End: 2023-07-29
Payer: COMMERCIAL

## 2023-07-29 ENCOUNTER — OUTPATIENT (OUTPATIENT)
Dept: OUTPATIENT SERVICES | Facility: HOSPITAL | Age: 43
LOS: 1 days | End: 2023-07-29
Payer: COMMERCIAL

## 2023-07-29 ENCOUNTER — APPOINTMENT (OUTPATIENT)
Dept: MAMMOGRAPHY | Facility: CLINIC | Age: 43
End: 2023-07-29
Payer: COMMERCIAL

## 2023-07-29 ENCOUNTER — RESULT REVIEW (OUTPATIENT)
Age: 43
End: 2023-07-29

## 2023-07-29 DIAGNOSIS — M25.572 PAIN IN LEFT ANKLE AND JOINTS OF LEFT FOOT: ICD-10-CM

## 2023-07-29 DIAGNOSIS — Z98.890 OTHER SPECIFIED POSTPROCEDURAL STATES: Chronic | ICD-10-CM

## 2023-07-29 DIAGNOSIS — Z00.00 ENCOUNTER FOR GENERAL ADULT MEDICAL EXAMINATION WITHOUT ABNORMAL FINDINGS: ICD-10-CM

## 2023-07-29 DIAGNOSIS — Z90.89 ACQUIRED ABSENCE OF OTHER ORGANS: Chronic | ICD-10-CM

## 2023-07-29 PROCEDURE — 73721 MRI JNT OF LWR EXTRE W/O DYE: CPT

## 2023-07-29 PROCEDURE — 77067 SCR MAMMO BI INCL CAD: CPT | Mod: 26

## 2023-07-29 PROCEDURE — 77063 BREAST TOMOSYNTHESIS BI: CPT | Mod: 26

## 2023-07-29 PROCEDURE — 73721 MRI JNT OF LWR EXTRE W/O DYE: CPT | Mod: 26,LT

## 2023-07-29 PROCEDURE — 77067 SCR MAMMO BI INCL CAD: CPT

## 2023-07-29 PROCEDURE — 77063 BREAST TOMOSYNTHESIS BI: CPT

## 2023-08-01 ENCOUNTER — RESULT REVIEW (OUTPATIENT)
Age: 43
End: 2023-08-01

## 2023-08-01 DIAGNOSIS — R92.2 INCONCLUSIVE MAMMOGRAM: ICD-10-CM

## 2023-08-09 ENCOUNTER — APPOINTMENT (OUTPATIENT)
Dept: MAMMOGRAPHY | Facility: CLINIC | Age: 43
End: 2023-08-09

## 2023-08-09 ENCOUNTER — APPOINTMENT (OUTPATIENT)
Dept: ULTRASOUND IMAGING | Facility: CLINIC | Age: 43
End: 2023-08-09

## 2023-08-11 ENCOUNTER — RESULT REVIEW (OUTPATIENT)
Age: 43
End: 2023-08-11

## 2023-08-11 ENCOUNTER — APPOINTMENT (OUTPATIENT)
Dept: MAMMOGRAPHY | Facility: CLINIC | Age: 43
End: 2023-08-11
Payer: COMMERCIAL

## 2023-08-11 ENCOUNTER — APPOINTMENT (OUTPATIENT)
Dept: ORTHOPEDIC SURGERY | Facility: CLINIC | Age: 43
End: 2023-08-11

## 2023-08-11 ENCOUNTER — APPOINTMENT (OUTPATIENT)
Dept: ULTRASOUND IMAGING | Facility: CLINIC | Age: 43
End: 2023-08-11
Payer: COMMERCIAL

## 2023-08-11 ENCOUNTER — OUTPATIENT (OUTPATIENT)
Dept: OUTPATIENT SERVICES | Facility: HOSPITAL | Age: 43
LOS: 1 days | End: 2023-08-11
Payer: COMMERCIAL

## 2023-08-11 ENCOUNTER — NON-APPOINTMENT (OUTPATIENT)
Age: 43
End: 2023-08-11

## 2023-08-11 DIAGNOSIS — R92.8 OTHER ABNORMAL AND INCONCLUSIVE FINDINGS ON DIAGNOSTIC IMAGING OF BREAST: ICD-10-CM

## 2023-08-11 DIAGNOSIS — R92.2 INCONCLUSIVE MAMMOGRAM: ICD-10-CM

## 2023-08-11 DIAGNOSIS — Z98.890 OTHER SPECIFIED POSTPROCEDURAL STATES: Chronic | ICD-10-CM

## 2023-08-11 DIAGNOSIS — Z90.89 ACQUIRED ABSENCE OF OTHER ORGANS: Chronic | ICD-10-CM

## 2023-08-11 PROCEDURE — 77066 DX MAMMO INCL CAD BI: CPT

## 2023-08-11 PROCEDURE — G0279: CPT | Mod: 26

## 2023-08-11 PROCEDURE — 76642 ULTRASOUND BREAST LIMITED: CPT | Mod: 26,50

## 2023-08-11 PROCEDURE — 77066 DX MAMMO INCL CAD BI: CPT | Mod: 26

## 2023-08-11 PROCEDURE — 76642 ULTRASOUND BREAST LIMITED: CPT

## 2023-08-11 PROCEDURE — G0279: CPT

## 2023-09-29 ENCOUNTER — APPOINTMENT (OUTPATIENT)
Dept: ORTHOPEDIC SURGERY | Facility: CLINIC | Age: 43
End: 2023-09-29

## 2023-11-08 ENCOUNTER — APPOINTMENT (OUTPATIENT)
Dept: INTERNAL MEDICINE | Facility: CLINIC | Age: 43
End: 2023-11-08

## 2024-02-16 ENCOUNTER — RESULT REVIEW (OUTPATIENT)
Age: 44
End: 2024-02-16

## 2024-02-16 ENCOUNTER — APPOINTMENT (OUTPATIENT)
Dept: MAMMOGRAPHY | Facility: CLINIC | Age: 44
End: 2024-02-16
Payer: COMMERCIAL

## 2024-02-16 ENCOUNTER — OUTPATIENT (OUTPATIENT)
Dept: OUTPATIENT SERVICES | Facility: HOSPITAL | Age: 44
LOS: 1 days | End: 2024-02-16
Payer: COMMERCIAL

## 2024-02-16 DIAGNOSIS — Z00.8 ENCOUNTER FOR OTHER GENERAL EXAMINATION: ICD-10-CM

## 2024-02-16 DIAGNOSIS — Z90.89 ACQUIRED ABSENCE OF OTHER ORGANS: Chronic | ICD-10-CM

## 2024-02-16 DIAGNOSIS — Z12.39 ENCOUNTER FOR OTHER SCREENING FOR MALIGNANT NEOPLASM OF BREAST: ICD-10-CM

## 2024-02-16 DIAGNOSIS — Z98.890 OTHER SPECIFIED POSTPROCEDURAL STATES: Chronic | ICD-10-CM

## 2024-02-16 DIAGNOSIS — R92.8 OTHER ABNORMAL AND INCONCLUSIVE FINDINGS ON DIAGNOSTIC IMAGING OF BREAST: ICD-10-CM

## 2024-02-16 PROCEDURE — 77065 DX MAMMO INCL CAD UNI: CPT

## 2024-02-16 PROCEDURE — G0279: CPT

## 2024-02-16 PROCEDURE — 77065 DX MAMMO INCL CAD UNI: CPT | Mod: 26,RT

## 2024-02-16 PROCEDURE — G0279: CPT | Mod: 26

## 2024-02-19 PROBLEM — Z12.39 BREAST CANCER SCREENING: Status: ACTIVE | Noted: 2024-02-19

## 2024-05-10 ENCOUNTER — APPOINTMENT (OUTPATIENT)
Dept: ORTHOPEDIC SURGERY | Facility: CLINIC | Age: 44
End: 2024-05-10
Payer: COMMERCIAL

## 2024-05-10 VITALS
BODY MASS INDEX: 35.87 KG/M2 | SYSTOLIC BLOOD PRESSURE: 114 MMHG | HEART RATE: 60 BPM | WEIGHT: 190 LBS | DIASTOLIC BLOOD PRESSURE: 80 MMHG | HEIGHT: 61 IN

## 2024-05-10 DIAGNOSIS — M25.562 PAIN IN LEFT KNEE: ICD-10-CM

## 2024-05-10 DIAGNOSIS — M23.92 UNSPECIFIED INTERNAL DERANGEMENT OF LEFT KNEE: ICD-10-CM

## 2024-05-10 PROCEDURE — 99204 OFFICE O/P NEW MOD 45 MIN: CPT | Mod: 25

## 2024-05-10 PROCEDURE — 20610 DRAIN/INJ JOINT/BURSA W/O US: CPT | Mod: LT

## 2024-05-10 PROCEDURE — 73564 X-RAY EXAM KNEE 4 OR MORE: CPT | Mod: LT

## 2024-05-10 NOTE — HISTORY OF PRESENT ILLNESS
[de-identified] : 5/10/24: Patient presents with 1 week of left knee pain.  States she started working out 2 months ago.  Pain is mostly over the patella tendon.  Denies rating pain numbness or tingling.  Does some times get a buckling sensation.  Does have locking.  Taking ibuprofen for the pain.  Denies prior treatments, allergies, past medical history.

## 2024-05-10 NOTE — PHYSICAL EXAM
[de-identified] : Left lower extremity Hip: Normal ROM without pain on IR/ER Knee Inspection: no effusion, no erythema. Wounds: None. Alignment: neutral. Palpation: Tender to palpation over anterior lateral joint line, mildly over patella tendon.  Positive patella grind test ROM active (in degrees): 0-130 Ligamentous laxity: stable to varus stress test, stable to valgus stress test Meniscal Test: Positive McMurrays, negative Anthony. Muscle Test: good quad strength. [de-identified] : 5/10/24: Left knee xrays, standing AP/Lateral and Merchant films, and 45 degree PA standing view taken today in the office are reviewed and demonstrate preserved joint space, minute osteophyte formation

## 2024-05-10 NOTE — DISCUSSION/SUMMARY
[de-identified] : Left knee internal derangement  Extensive discussion of the natural history of this issue was had with the patient.  We discussed the treatment options focusing on conservative therapy which includes anti-inflammatories, physical therapy/home exercise, & activity modification.   -Physical therapy prescription was given to the patient. Patient elected for a steroid injection today. Patient will return if the pain returns or does not resolve.  The patient's current medication management of their orthopedic diagnosis was reviewed today: (1) We discussed a comprehensive treatment plan that included possible pharmaceutical management involving the use of prescription strength medications including but not limited to options such as oral Ibuprofen 400mg QID, once daily Meloxicam 15 mg, or 500-650 mg Tylenol versus over the counter oral medications and topical prescription NSAID Pennsaid vs over the counter Voltaren gel. (2) There is a moderate risk of morbidity with further treatment, especially from use of prescription strength medications and possible side effects of these medications which include upset stomach with oral medications, skin reactions to topical medications and cardiac/renal issues with long term use. (3) I recommended that the patient follow-up with their medical physician to discuss any significant specific potential issues with long term medication use such as interactions with current medications or with exacerbation of underlying medical comorbidities. (4) The benefits and risks associated with use of injectable, oral or topical, prescription and over the counter anti-inflammatory medications were discussed with the patient. The patient voiced understanding of the risks including but not limited to bleeding, stroke, kidney dysfunction, heart disease, and were referred to the black box warning label for further information.

## 2024-05-10 NOTE — PROCEDURE
[de-identified] : 5/10/24: Left knee injection - Steroid The risks, benefits, and alternatives of the injection were reviewed/discussed with the patient today in office and all of their questions were answered. We discussed possible side effects and efficacy of this injection.  The patient consented to the procedure.  Site and side were verified with the patient.  The injection site was prepped with chloroprep.  Cold spray was utilized to numb the skin. Utilizing sterile technique, 1 ml 40 mg methylprednisolone, 4 ml 1% Lidocaine, and 5 mL 0.25% Bupivicaine were injected. A sterile bandage was applied. The patient tolerated the procedure well and there were no complications.

## 2024-06-13 ENCOUNTER — APPOINTMENT (OUTPATIENT)
Dept: ORTHOPEDIC SURGERY | Facility: CLINIC | Age: 44
End: 2024-06-13

## 2024-06-19 ENCOUNTER — APPOINTMENT (OUTPATIENT)
Dept: ORTHOPEDIC SURGERY | Facility: CLINIC | Age: 44
End: 2024-06-19

## 2024-06-19 VITALS
DIASTOLIC BLOOD PRESSURE: 80 MMHG | HEART RATE: 70 BPM | HEIGHT: 61 IN | SYSTOLIC BLOOD PRESSURE: 128 MMHG | BODY MASS INDEX: 35.87 KG/M2 | WEIGHT: 190 LBS

## 2024-06-19 DIAGNOSIS — S83.282A OTHER TEAR OF LATERAL MENISCUS, CURRENT INJURY, LEFT KNEE, INITIAL ENCOUNTER: ICD-10-CM

## 2024-06-19 PROCEDURE — 73560 X-RAY EXAM OF KNEE 1 OR 2: CPT | Mod: LT

## 2024-06-19 PROCEDURE — 99213 OFFICE O/P EST LOW 20 MIN: CPT

## 2024-06-20 VITALS
BODY MASS INDEX: 35.87 KG/M2 | SYSTOLIC BLOOD PRESSURE: 128 MMHG | WEIGHT: 190 LBS | HEIGHT: 61 IN | DIASTOLIC BLOOD PRESSURE: 80 MMHG | HEART RATE: 70 BPM

## 2024-06-20 PROBLEM — S83.282A TEAR OF LATERAL MENISCUS OF LEFT KNEE, CURRENT, UNSPECIFIED TEAR TYPE, INITIAL ENCOUNTER: Status: ACTIVE | Noted: 2024-06-19

## 2024-06-20 NOTE — HISTORY OF PRESENT ILLNESS
[de-identified] : The patient comes in today with complaints of pain to her left knee.  She states she had an injury while at work.  She was picking up a box and twisted with the onset of pain over the lateral aspect of her knee.  She did have a history of having pain about a month or two ago.  She saw a different orthopedist and states she was given an anti-inflammatory and with rest, the pain resolved.  The patient states the onset/injury occurred 06/11/2024.  The patient states the pain is intermittent.  The patient describes the pain as sharp. [de-identified] : ice [de-identified] : walking

## 2024-06-20 NOTE — ADDENDUM
[FreeTextEntry1] : This note was written by Jael Dow on 06/20/2024 acting as scribe for Tylor Espinoza III, MD

## 2024-06-20 NOTE — REASON FOR VISIT
[Initial Visit] : an initial visit for [FreeTextEntry2] : her left knee.  This visit is related to Workers' Compensation.  DOI:  06/11/2024

## 2024-06-20 NOTE — PHYSICAL EXAM
[de-identified] : Gait and Station:  Ambulating with a slightly antalgic to antalgic gait.  Station:  Normal.  [de-identified] : Right Knee: Knee: Range of Motion in Degrees	 	                  Claimant:	Normal:	 Flexion Active	  135 	                135-degrees	 Flexion Passive	  135	                135-degrees	 Extension Active	  0-5	                0-5-degrees	 Extension Passive	  0-5	                0-5-degrees	  No weakness to flexion/extension.  No evidence of instability in the AP plane or varus or valgus stress.  Negative  Lachman.  Negative pivot shift.  Negative anterior drawer test.  Negative posterior drawer test.  Negative Abhinav.  Negative Apley grind.  No medial or lateral joint line tenderness.  No tenderness over the medial and lateral facet of the patella.  No patellofemoral crepitations.  No lateral tilting patella.  No patellar apprehension.  No crepitation in the medial and lateral femoral condyle.  No proximal or distal swelling, edema or tenderness.  No gross motor or sensory deficits.  No intra-articular swelling.  2+ DP and PT pulses. No varus or valgus malalignment.  Skin is intact.  No rashes, scars or lesions.     Left Knee: Knee: Range of Motion in Degrees	 	                  Claimant:	Normal:	 Flexion Active	  135 	                135-degrees	 Flexion Passive	  135	                135-degrees	 Extension Active	  0-5	                0-5-degrees	 Extension Passive	  0-5	                0-5-degrees	   No weakness to flexion/extension.  No evidence of instability in the AP plane or varus or valgus stress.  Negative  Lachman.  Negative pivot shift.  Negative anterior drawer test.  Negative posterior drawer test.  Positive lateral joint line tenderness.  Positive Abhinav.  Positive Apley grind.  No medial joint line tenderness.  No tenderness over the medial and lateral facet of the patella.  No patellofemoral crepitations.  No lateral tilting patella.  No patella apprehension.  No crepitation in the medial and lateral femoral condyle.  No proximal or distal swelling, edema or tenderness.  No gross motor or sensory deficits.  Mild intra-articular swelling.  2+ DP and PT pulses.  No varus or valgus malalignment.  Skin is intact.  No rashes, scars or lesions.     [de-identified] : Appearance:  Well-developed, well-nourished female in no acute distress.   [de-identified] : Radiographs, one to two views of the left knee taken in the office today, show no obvious osseous abnormalities.

## 2024-07-17 ENCOUNTER — APPOINTMENT (OUTPATIENT)
Dept: ORTHOPEDIC SURGERY | Facility: CLINIC | Age: 44
End: 2024-07-17

## 2024-09-09 ENCOUNTER — APPOINTMENT (OUTPATIENT)
Dept: ORTHOPEDIC SURGERY | Facility: CLINIC | Age: 44
End: 2024-09-09

## 2024-09-09 VITALS
WEIGHT: 190 LBS | BODY MASS INDEX: 35.87 KG/M2 | DIASTOLIC BLOOD PRESSURE: 82 MMHG | HEIGHT: 61 IN | HEART RATE: 62 BPM | SYSTOLIC BLOOD PRESSURE: 137 MMHG

## 2024-09-09 DIAGNOSIS — S83.282A OTHER TEAR OF LATERAL MENISCUS, CURRENT INJURY, LEFT KNEE, INITIAL ENCOUNTER: ICD-10-CM

## 2024-09-09 PROCEDURE — 99213 OFFICE O/P EST LOW 20 MIN: CPT

## 2024-09-10 VITALS
WEIGHT: 190 LBS | HEIGHT: 61 IN | BODY MASS INDEX: 35.87 KG/M2 | DIASTOLIC BLOOD PRESSURE: 82 MMHG | SYSTOLIC BLOOD PRESSURE: 137 MMHG

## 2024-09-10 NOTE — DISCUSSION/SUMMARY
[de-identified] : At this time, due to left knee lateral meniscal tear and since she has failed other conservative modalities, I recommended an MRI.  She will be reassessed after the MRI.  THIS IS A FORMAL REQUEST FOR AUTHORIZATION FOR AN MRI OF THE LEFT KNEE.  The Workers' Compensation guidelines have been followed.

## 2024-09-10 NOTE — REASON FOR VISIT
[Follow-Up Visit] : a follow-up visit for [FreeTextEntry2] : her left knee.  This visit is related to Workers' Compensation.  DOI:  06/11/2024

## 2024-09-10 NOTE — HISTORY OF PRESENT ILLNESS
[de-identified] : The patient comes in today for her left knee.  She states she is feeling somewhat better from the therapy, but she is still having persistent complaints.  She points to the lateral side of her knee as the source of the pain.  She notes episodes of the knee catching and popping.

## 2024-09-10 NOTE — PHYSICAL EXAM
[de-identified] : Left Knee: Range of Motion in Degrees	 	                  Claimant:	Normal:	 Flexion Active	          135 	 135-degrees	 Flexion Passive	  135	 135-degrees	 Extension Active	  0-5	         0-5-degrees	 Extension Passive	  0-5	         0-5-degrees	   No weakness to flexion/extension.  No evidence of instability in the AP plane or varus or valgus stress.  Negative  Lachman.  Negative pivot shift.  Negative anterior drawer test.  Negative posterior drawer test.  Positive lateral joint line tenderness.  Positive Abhinav.  Positive Apley grind.  No medial joint line tenderness.  No tenderness over the medial and lateral facet of the patella.  No patellofemoral crepitations.  No lateral tilting patella.  No patella apprehension.  No crepitation in the medial and lateral femoral condyle.  No proximal or distal swelling, edema or tenderness.  No gross motor or sensory deficits.  Mild intra-articular swelling.  2+ DP and PT pulses.  No varus or valgus malalignment.  Skin is intact.  No rashes, scars or lesions.     [de-identified] : Gait and Station:  Ambulating with a slightly antalgic to antalgic gait.  Normal Station.  [de-identified] : Appearance:  Well-developed, well-nourished female in no acute distress.

## 2024-09-10 NOTE — HISTORY OF PRESENT ILLNESS
[de-identified] : The patient comes in today for her left knee.  She states she is feeling somewhat better from the therapy, but she is still having persistent complaints.  She points to the lateral side of her knee as the source of the pain.  She notes episodes of the knee catching and popping.

## 2024-09-10 NOTE — DISCUSSION/SUMMARY
[de-identified] : At this time, due to left knee lateral meniscal tear and since she has failed other conservative modalities, I recommended an MRI.  She will be reassessed after the MRI.  THIS IS A FORMAL REQUEST FOR AUTHORIZATION FOR AN MRI OF THE LEFT KNEE.  The Workers' Compensation guidelines have been followed.

## 2024-09-10 NOTE — ADDENDUM
[FreeTextEntry1] : This note was written by Rory Phillips on 09/10/2024, acting as a scribe for Tylor Espinoza III, MD

## 2024-09-10 NOTE — PHYSICAL EXAM
[de-identified] : Left Knee: Range of Motion in Degrees	 	                  Claimant:	Normal:	 Flexion Active	          135 	 135-degrees	 Flexion Passive	  135	 135-degrees	 Extension Active	  0-5	         0-5-degrees	 Extension Passive	  0-5	         0-5-degrees	   No weakness to flexion/extension.  No evidence of instability in the AP plane or varus or valgus stress.  Negative  Lachman.  Negative pivot shift.  Negative anterior drawer test.  Negative posterior drawer test.  Positive lateral joint line tenderness.  Positive Abhinav.  Positive Apley grind.  No medial joint line tenderness.  No tenderness over the medial and lateral facet of the patella.  No patellofemoral crepitations.  No lateral tilting patella.  No patella apprehension.  No crepitation in the medial and lateral femoral condyle.  No proximal or distal swelling, edema or tenderness.  No gross motor or sensory deficits.  Mild intra-articular swelling.  2+ DP and PT pulses.  No varus or valgus malalignment.  Skin is intact.  No rashes, scars or lesions.     [de-identified] : Gait and Station:  Ambulating with a slightly antalgic to antalgic gait.  Normal Station.  [de-identified] : Appearance:  Well-developed, well-nourished female in no acute distress.

## 2024-09-12 ENCOUNTER — APPOINTMENT (OUTPATIENT)
Dept: ORTHOPEDIC SURGERY | Facility: CLINIC | Age: 44
End: 2024-09-12
Payer: COMMERCIAL

## 2024-09-12 DIAGNOSIS — M25.462 EFFUSION, LEFT KNEE: ICD-10-CM

## 2024-09-12 PROCEDURE — 73564 X-RAY EXAM KNEE 4 OR MORE: CPT | Mod: LT

## 2024-09-12 PROCEDURE — 99203 OFFICE O/P NEW LOW 30 MIN: CPT

## 2024-09-13 NOTE — HISTORY OF PRESENT ILLNESS
[Localized] : localized [Constant] : constant [Sleep] : sleep [Standing] : standing [Walking] : walking [Lying in bed] : lying in bed [] : no [FreeTextEntry5] : 43 y/o F presents for NP eval of the knee today. Pt reports recent dx of meniscus tear. Has tried PT with no improvement. Notes original injury sustained back in June. No use of NSAIDs. Swelling persists. Occasional locking.

## 2024-09-13 NOTE — ASSESSMENT
[FreeTextEntry1] : The patient is a 44-year-old female with chief complaint of left knee pain.  In May 2024 she twisted it and now the pain is gotten significantly worse.  She has pain at rest and occasionally pain at night.  She has pain with walking and stairs.  She has trouble going from sitting to standing and getting in and out of a car.  She has lateral pain with squatting.  She gets occasional swelling.  She has tried physical therapy with no relief.  She has had Advil which takes the edge off for a while.  She has had no injections.  Examination of the left lower extremity reveals normal neurovascular exam.  Examination left knee reveals a full range of motion with a trace effusion.  There is lateral joint line tenderness with equivocal Abhinav's sign.  There is no medial joint complaints or instability.  There is normal patella tracking with no crepitation or apprehension.  There is no redness, rashes or visible scars.  X-rays done in the office today of the left knee 4 views weightbearing shows no evidence of arthritis, loose bodies tumors masses or calcification.  Normal study.  The plan at this time is get an MRI of the left knee to rule out a lateral meniscus tear.  We will decide on a course of action following the study.

## 2024-09-13 NOTE — HISTORY OF PRESENT ILLNESS
[Localized] : localized [Constant] : constant [Sleep] : sleep [Standing] : standing [Walking] : walking [Lying in bed] : lying in bed [] : no [FreeTextEntry5] : 45 y/o F presents for NP eval of the knee today. Pt reports recent dx of meniscus tear. Has tried PT with no improvement. Notes original injury sustained back in June. No use of NSAIDs. Swelling persists. Occasional locking.

## 2024-09-26 ENCOUNTER — APPOINTMENT (OUTPATIENT)
Dept: ORTHOPEDIC SURGERY | Facility: CLINIC | Age: 44
End: 2024-09-26

## 2024-09-26 VITALS
HEART RATE: 76 BPM | DIASTOLIC BLOOD PRESSURE: 82 MMHG | HEIGHT: 61 IN | SYSTOLIC BLOOD PRESSURE: 120 MMHG | WEIGHT: 190 LBS | BODY MASS INDEX: 35.87 KG/M2

## 2024-09-26 DIAGNOSIS — M17.12 UNILATERAL PRIMARY OSTEOARTHRITIS, LEFT KNEE: ICD-10-CM

## 2024-09-26 PROCEDURE — 20610 DRAIN/INJ JOINT/BURSA W/O US: CPT | Mod: LT

## 2024-09-30 NOTE — HISTORY OF PRESENT ILLNESS
[de-identified] : The patient comes in today for her left knee. We reviewed the MRI, as noted below.

## 2024-09-30 NOTE — PHYSICAL EXAM
[de-identified] : Left Knee: Range of Motion in Degrees	 	                                   Claimant:	           Normal:	 Flexion Active	                      135 	                135-degrees	 Flexion Passive	              135	                135-degrees	 Extension Active	              0-5	                 0-5-degrees	 Extension Passive	              0-5	                 0-5-degrees	  Lateral joint line tenderness. Tenderness over the lateral facet of the patella. No weakness to flexion/extension.  No evidence of instability in the AP plane or varus or valgus stress.  Negative Lachman.  Negative pivot shift.  Negative anterior drawer test.  Negative posterior drawer test.  Negative Abhinav.  Negative Apley grind.  No medial or lateral joint line tenderness.  Positive tenderness over the lateral facet of the patella.  No tenderness over the medial facet of the patella.  Positive patellofemoral crepitations.  No lateral tilting patella.  No patella apprehension.  No crepitation in the medial and lateral femoral condyle.  No proximal or distal swelling, edema or tenderness.  No gross motor or sensory deficits.  No intra-articular swelling.  2+ DP and PT pulses.  No varus or valgus malalignment.  Skin is intact.  No rashes, scars or lesions.     [de-identified] : Ambulating with a slightly antalgic to antalgic gait.  Station:  Normal.  [de-identified] : Appearance:  Well-developed, well-nourished female in no acute distress.   [de-identified] : Review of the MRI shows chondral fissures in the lateral patella facet, but no meniscal tear.

## 2024-09-30 NOTE — PHYSICAL EXAM
[de-identified] : Left Knee: Range of Motion in Degrees	 	                                   Claimant:	           Normal:	 Flexion Active	                      135 	                135-degrees	 Flexion Passive	              135	                135-degrees	 Extension Active	              0-5	                 0-5-degrees	 Extension Passive	              0-5	                 0-5-degrees	  Lateral joint line tenderness. Tenderness over the lateral facet of the patella. No weakness to flexion/extension.  No evidence of instability in the AP plane or varus or valgus stress.  Negative Lachman.  Negative pivot shift.  Negative anterior drawer test.  Negative posterior drawer test.  Negative Abhinav.  Negative Apley grind.  No medial or lateral joint line tenderness.  Positive tenderness over the lateral facet of the patella.  No tenderness over the medial facet of the patella.  Positive patellofemoral crepitations.  No lateral tilting patella.  No patella apprehension.  No crepitation in the medial and lateral femoral condyle.  No proximal or distal swelling, edema or tenderness.  No gross motor or sensory deficits.  No intra-articular swelling.  2+ DP and PT pulses.  No varus or valgus malalignment.  Skin is intact.  No rashes, scars or lesions.     [de-identified] : Ambulating with a slightly antalgic to antalgic gait.  Station:  Normal.  [de-identified] : Appearance:  Well-developed, well-nourished female in no acute distress.   [de-identified] : Review of the MRI shows chondral fissures in the lateral patella facet, but no meniscal tear.

## 2024-09-30 NOTE — PROCEDURE
[de-identified] : Indication: Patellofemoral arthritis of the left knee   Consent: At this time, I have recommended an injection into the left knee. The risks and benefits of the procedure were discussed with the patient in detail.  Upon verbal consent of the patient, we proceeded with the injection as noted below.   Procedure: After a sterile prep, the patient underwent an injection of 9 mL of 1% Lidocaine (10mg/mL) without epinephrine and 1 mL of triamcinolone acetonide (40mg/mL) into the patellofemoral arthritis. The patient tolerated the procedure well.  There were no complications.   :  [employer] Drug name:     Triamcinolone Acetonide Injectable Suspension [employer] NDC #:            [address]-2 Lot #:               [id number] Expiration:      08/31/2025

## 2024-09-30 NOTE — DISCUSSION/SUMMARY
[de-identified] : At this time, due to patellofemoral arthritis of the left knee, I recommended a cortisone injection (as noted above), ice and elevation. She will be reassessed in three to four weeks.  The Workers' Compensation guidelines have been followed.

## 2024-09-30 NOTE — PROCEDURE
[de-identified] : Indication: Patellofemoral arthritis of the left knee   Consent: At this time, I have recommended an injection into the left knee. The risks and benefits of the procedure were discussed with the patient in detail.  Upon verbal consent of the patient, we proceeded with the injection as noted below.   Procedure: After a sterile prep, the patient underwent an injection of 9 mL of 1% Lidocaine (10mg/mL) without epinephrine and 1 mL of triamcinolone acetonide (40mg/mL) into the patellofemoral arthritis. The patient tolerated the procedure well.  There were no complications.   :  [employer] Drug name:     Triamcinolone Acetonide Injectable Suspension [employer] NDC #:            [address]-0 Lot #:               [id number] Expiration:      08/31/2025

## 2024-09-30 NOTE — ADDENDUM
[FreeTextEntry1] : This note was written by Sylvia Burks on 09/30/2024 acting as a scribe for SONY ARENAS III, MD

## 2024-09-30 NOTE — DISCUSSION/SUMMARY
[de-identified] : At this time, due to patellofemoral arthritis of the left knee, I recommended a cortisone injection (as noted above), ice and elevation. She will be reassessed in three to four weeks.  The Workers' Compensation guidelines have been followed.

## 2024-09-30 NOTE — HISTORY OF PRESENT ILLNESS
[de-identified] : The patient comes in today for her left knee. We reviewed the MRI, as noted below.

## 2024-09-30 NOTE — REASON FOR VISIT
[Follow-Up Visit] : a follow-up visit for [FreeTextEntry2] : the left knee. This visit is related to Workers' Compensation. Date of Injury: 6/11/2024.

## 2024-10-04 ENCOUNTER — APPOINTMENT (OUTPATIENT)
Dept: MAMMOGRAPHY | Facility: CLINIC | Age: 44
End: 2024-10-04

## 2024-10-04 ENCOUNTER — APPOINTMENT (OUTPATIENT)
Dept: ULTRASOUND IMAGING | Facility: CLINIC | Age: 44
End: 2024-10-04

## 2024-10-04 ENCOUNTER — OUTPATIENT (OUTPATIENT)
Dept: OUTPATIENT SERVICES | Facility: HOSPITAL | Age: 44
LOS: 1 days | End: 2024-10-04

## 2024-10-04 DIAGNOSIS — Z90.89 ACQUIRED ABSENCE OF OTHER ORGANS: Chronic | ICD-10-CM

## 2024-10-04 DIAGNOSIS — Z98.890 OTHER SPECIFIED POSTPROCEDURAL STATES: Chronic | ICD-10-CM

## 2024-10-04 DIAGNOSIS — R92.30 DENSE BREASTS, UNSPECIFIED: ICD-10-CM

## 2024-10-04 DIAGNOSIS — Z12.39 ENCOUNTER FOR OTHER SCREENING FOR MALIGNANT NEOPLASM OF BREAST: ICD-10-CM

## 2024-10-04 DIAGNOSIS — Z00.8 ENCOUNTER FOR OTHER GENERAL EXAMINATION: ICD-10-CM

## 2024-10-16 ENCOUNTER — APPOINTMENT (OUTPATIENT)
Dept: ORTHOPEDIC SURGERY | Facility: CLINIC | Age: 44
End: 2024-10-16

## 2024-10-16 NOTE — ED STATDOCS - WET READ LAUNCH FT
Addended by: SHAHRZAD ANN on: 10/16/2024 11:34 AM     Modules accepted: Orders     There are no Wet Read(s) to document.

## 2025-01-15 ENCOUNTER — APPOINTMENT (OUTPATIENT)
Facility: CLINIC | Age: 45
End: 2025-01-15
Payer: COMMERCIAL

## 2025-01-15 DIAGNOSIS — M17.12 UNILATERAL PRIMARY OSTEOARTHRITIS, LEFT KNEE: ICD-10-CM

## 2025-01-15 PROCEDURE — 99213 OFFICE O/P EST LOW 20 MIN: CPT

## 2025-01-16 RX ORDER — HYALURONATE SODIUM 20 MG/2 ML
20 SYRINGE (ML) INTRAARTICULAR
Qty: 3 | Refills: 0 | Status: ACTIVE | COMMUNITY
Start: 2025-01-16 | End: 1900-01-01

## 2025-02-04 ENCOUNTER — APPOINTMENT (OUTPATIENT)
Facility: CLINIC | Age: 45
End: 2025-02-04
Payer: COMMERCIAL

## 2025-02-04 PROCEDURE — 99213 OFFICE O/P EST LOW 20 MIN: CPT | Mod: 25

## 2025-02-04 PROCEDURE — 20611 DRAIN/INJ JOINT/BURSA W/US: CPT | Mod: LT

## 2025-02-12 ENCOUNTER — APPOINTMENT (OUTPATIENT)
Facility: CLINIC | Age: 45
End: 2025-02-12
Payer: COMMERCIAL

## 2025-02-12 DIAGNOSIS — S83.282A OTHER TEAR OF LATERAL MENISCUS, CURRENT INJURY, LEFT KNEE, INITIAL ENCOUNTER: ICD-10-CM

## 2025-02-12 PROCEDURE — 20611 DRAIN/INJ JOINT/BURSA W/US: CPT | Mod: LT

## 2025-02-18 ENCOUNTER — APPOINTMENT (OUTPATIENT)
Facility: CLINIC | Age: 45
End: 2025-02-18
Payer: COMMERCIAL

## 2025-02-18 DIAGNOSIS — M17.12 UNILATERAL PRIMARY OSTEOARTHRITIS, LEFT KNEE: ICD-10-CM

## 2025-02-18 PROCEDURE — 20611 DRAIN/INJ JOINT/BURSA W/US: CPT | Mod: LT

## 2025-02-28 ENCOUNTER — APPOINTMENT (OUTPATIENT)
Dept: MAMMOGRAPHY | Facility: CLINIC | Age: 45
End: 2025-02-28

## 2025-02-28 ENCOUNTER — APPOINTMENT (OUTPATIENT)
Dept: ULTRASOUND IMAGING | Facility: CLINIC | Age: 45
End: 2025-02-28

## 2025-06-10 ENCOUNTER — APPOINTMENT (OUTPATIENT)
Dept: MAMMOGRAPHY | Facility: CLINIC | Age: 45
End: 2025-06-10

## 2025-06-10 ENCOUNTER — APPOINTMENT (OUTPATIENT)
Dept: ULTRASOUND IMAGING | Facility: CLINIC | Age: 45
End: 2025-06-10

## 2025-07-16 ENCOUNTER — APPOINTMENT (OUTPATIENT)
Dept: PULMONOLOGY | Facility: CLINIC | Age: 45
End: 2025-07-16

## 2025-07-16 ENCOUNTER — APPOINTMENT (OUTPATIENT)
Dept: INTERNAL MEDICINE | Facility: CLINIC | Age: 45
End: 2025-07-16

## 2025-08-29 ENCOUNTER — RESULT CHARGE (OUTPATIENT)
Age: 45
End: 2025-08-29

## 2025-08-29 ENCOUNTER — APPOINTMENT (OUTPATIENT)
Facility: CLINIC | Age: 45
End: 2025-08-29

## 2025-08-29 VITALS — HEIGHT: 61 IN | BODY MASS INDEX: 35.87 KG/M2 | WEIGHT: 190 LBS

## 2025-08-29 DIAGNOSIS — S83.282A OTHER TEAR OF LATERAL MENISCUS, CURRENT INJURY, LEFT KNEE, INITIAL ENCOUNTER: ICD-10-CM

## 2025-08-29 DIAGNOSIS — M17.12 UNILATERAL PRIMARY OSTEOARTHRITIS, LEFT KNEE: ICD-10-CM

## 2025-08-29 RX ORDER — MELOXICAM 15 MG/1
15 TABLET ORAL
Qty: 30 | Refills: 0 | Status: ACTIVE | COMMUNITY
Start: 2025-08-29 | End: 1900-01-01

## 2025-09-09 RX ORDER — HYALURONATE SODIUM 20 MG/2 ML
20 SYRINGE (ML) INTRAARTICULAR
Qty: 3 | Refills: 0 | Status: ACTIVE | COMMUNITY
Start: 2025-09-09 | End: 1900-01-01

## 2025-09-18 ENCOUNTER — APPOINTMENT (OUTPATIENT)
Facility: CLINIC | Age: 45
End: 2025-09-18
Payer: COMMERCIAL

## 2025-09-18 VITALS — HEIGHT: 61 IN | BODY MASS INDEX: 35.87 KG/M2 | WEIGHT: 190 LBS

## 2025-09-18 PROCEDURE — 99213 OFFICE O/P EST LOW 20 MIN: CPT | Mod: 25

## 2025-09-18 PROCEDURE — 20610 DRAIN/INJ JOINT/BURSA W/O US: CPT | Mod: LT
